# Patient Record
Sex: FEMALE | Race: WHITE | NOT HISPANIC OR LATINO | Employment: FULL TIME | URBAN - METROPOLITAN AREA
[De-identification: names, ages, dates, MRNs, and addresses within clinical notes are randomized per-mention and may not be internally consistent; named-entity substitution may affect disease eponyms.]

---

## 2018-08-22 ENCOUNTER — HOSPITAL ENCOUNTER (EMERGENCY)
Facility: HOSPITAL | Age: 28
Discharge: HOME | End: 2018-08-22
Attending: EMERGENCY MEDICINE
Payer: MEDICAID

## 2018-08-22 VITALS
HEIGHT: 62 IN | TEMPERATURE: 99 F | BODY MASS INDEX: 23 KG/M2 | DIASTOLIC BLOOD PRESSURE: 61 MMHG | WEIGHT: 125 LBS | OXYGEN SATURATION: 100 % | SYSTOLIC BLOOD PRESSURE: 107 MMHG | RESPIRATION RATE: 12 BRPM | HEART RATE: 80 BPM

## 2018-08-22 DIAGNOSIS — O21.9 NAUSEA/VOMITING IN PREGNANCY: ICD-10-CM

## 2018-08-22 DIAGNOSIS — Z3A.01 LESS THAN 8 WEEKS GESTATION OF PREGNANCY: Primary | ICD-10-CM

## 2018-08-22 LAB
ALBUMIN SERPL BCP-MCNC: 4.1 G/DL
ALP SERPL-CCNC: 62 U/L
ALT SERPL W/O P-5'-P-CCNC: 9 U/L
ANION GAP SERPL CALC-SCNC: 9 MMOL/L
AST SERPL-CCNC: 17 U/L
B-HCG UR QL: POSITIVE
BASOPHILS # BLD AUTO: 0.01 K/UL
BASOPHILS NFR BLD: 0.1 %
BILIRUB SERPL-MCNC: 0.5 MG/DL
BILIRUB UR QL STRIP: NEGATIVE
BUN SERPL-MCNC: 6 MG/DL
CALCIUM SERPL-MCNC: 9.4 MG/DL
CHLORIDE SERPL-SCNC: 104 MMOL/L
CLARITY UR: CLEAR
CO2 SERPL-SCNC: 23 MMOL/L
COLOR UR: YELLOW
CREAT SERPL-MCNC: 0.6 MG/DL
CTP QC/QA: YES
DEPRECATED S PYO AG THROAT QL EIA: NEGATIVE
DIFFERENTIAL METHOD: ABNORMAL
EOSINOPHIL # BLD AUTO: 0 K/UL
EOSINOPHIL NFR BLD: 0.1 %
ERYTHROCYTE [DISTWIDTH] IN BLOOD BY AUTOMATED COUNT: 11.9 %
EST. GFR  (AFRICAN AMERICAN): >60 ML/MIN/1.73 M^2
EST. GFR  (NON AFRICAN AMERICAN): >60 ML/MIN/1.73 M^2
FLUAV AG SPEC QL IA: NEGATIVE
FLUBV AG SPEC QL IA: NEGATIVE
GLUCOSE SERPL-MCNC: 103 MG/DL
GLUCOSE UR QL STRIP: NEGATIVE
HCG INTACT+B SERPL-ACNC: NORMAL MIU/ML
HCT VFR BLD AUTO: 37.3 %
HGB BLD-MCNC: 13 G/DL
HGB UR QL STRIP: NEGATIVE
KETONES UR QL STRIP: ABNORMAL
LEUKOCYTE ESTERASE UR QL STRIP: NEGATIVE
LYMPHOCYTES # BLD AUTO: 0.5 K/UL
LYMPHOCYTES NFR BLD: 7.1 %
MCH RBC QN AUTO: 32.7 PG
MCHC RBC AUTO-ENTMCNC: 34.9 G/DL
MCV RBC AUTO: 94 FL
MONOCYTES # BLD AUTO: 0.8 K/UL
MONOCYTES NFR BLD: 11.4 %
NEUTROPHILS # BLD AUTO: 5.6 K/UL
NEUTROPHILS NFR BLD: 81.2 %
NITRITE UR QL STRIP: NEGATIVE
PH UR STRIP: 7 [PH] (ref 5–8)
PLATELET # BLD AUTO: 162 K/UL
PMV BLD AUTO: 10.4 FL
POTASSIUM SERPL-SCNC: 3.6 MMOL/L
PROT SERPL-MCNC: 7.2 G/DL
PROT UR QL STRIP: NEGATIVE
RBC # BLD AUTO: 3.97 M/UL
SODIUM SERPL-SCNC: 136 MMOL/L
SP GR UR STRIP: 1.02 (ref 1–1.03)
SPECIMEN SOURCE: NORMAL
URN SPEC COLLECT METH UR: ABNORMAL
UROBILINOGEN UR STRIP-ACNC: 1 EU/DL
WBC # BLD AUTO: 6.86 K/UL

## 2018-08-22 PROCEDURE — 80053 COMPREHEN METABOLIC PANEL: CPT

## 2018-08-22 PROCEDURE — 85025 COMPLETE CBC W/AUTO DIFF WBC: CPT

## 2018-08-22 PROCEDURE — 81003 URINALYSIS AUTO W/O SCOPE: CPT

## 2018-08-22 PROCEDURE — 25000003 PHARM REV CODE 250: Performed by: EMERGENCY MEDICINE

## 2018-08-22 PROCEDURE — 84702 CHORIONIC GONADOTROPIN TEST: CPT

## 2018-08-22 PROCEDURE — 96365 THER/PROPH/DIAG IV INF INIT: CPT

## 2018-08-22 PROCEDURE — 87081 CULTURE SCREEN ONLY: CPT

## 2018-08-22 PROCEDURE — 81025 URINE PREGNANCY TEST: CPT | Performed by: EMERGENCY MEDICINE

## 2018-08-22 PROCEDURE — 63600175 PHARM REV CODE 636 W HCPCS: Performed by: EMERGENCY MEDICINE

## 2018-08-22 PROCEDURE — 87400 INFLUENZA A/B EACH AG IA: CPT | Mod: 59

## 2018-08-22 PROCEDURE — 87880 STREP A ASSAY W/OPTIC: CPT

## 2018-08-22 PROCEDURE — 96361 HYDRATE IV INFUSION ADD-ON: CPT

## 2018-08-22 PROCEDURE — 96375 TX/PRO/DX INJ NEW DRUG ADDON: CPT

## 2018-08-22 PROCEDURE — 99284 EMERGENCY DEPT VISIT MOD MDM: CPT | Mod: 25

## 2018-08-22 RX ORDER — PROMETHAZINE HYDROCHLORIDE 25 MG/1
25 TABLET ORAL EVERY 6 HOURS PRN
Qty: 15 TABLET | Refills: 0 | Status: SHIPPED | OUTPATIENT
Start: 2018-08-22 | End: 2018-10-19 | Stop reason: ALTCHOICE

## 2018-08-22 RX ORDER — METOCLOPRAMIDE HYDROCHLORIDE 5 MG/ML
10 INJECTION INTRAMUSCULAR; INTRAVENOUS
Status: COMPLETED | OUTPATIENT
Start: 2018-08-22 | End: 2018-08-22

## 2018-08-22 RX ORDER — PROMETHAZINE HYDROCHLORIDE 25 MG/1
25 SUPPOSITORY RECTAL EVERY 6 HOURS PRN
Qty: 10 SUPPOSITORY | Refills: 0 | Status: SHIPPED | OUTPATIENT
Start: 2018-08-22 | End: 2018-10-19

## 2018-08-22 RX ORDER — ACETAMINOPHEN 500 MG
1000 TABLET ORAL
Status: COMPLETED | OUTPATIENT
Start: 2018-08-22 | End: 2018-08-22

## 2018-08-22 RX ADMIN — ACETAMINOPHEN 1000 MG: 500 TABLET ORAL at 08:08

## 2018-08-22 RX ADMIN — PROMETHAZINE HYDROCHLORIDE 12.5 MG: 25 INJECTION INTRAMUSCULAR; INTRAVENOUS at 10:08

## 2018-08-22 RX ADMIN — METOCLOPRAMIDE 10 MG: 5 INJECTION, SOLUTION INTRAMUSCULAR; INTRAVENOUS at 08:08

## 2018-08-22 RX ADMIN — SODIUM CHLORIDE 1000 ML: 0.9 INJECTION, SOLUTION INTRAVENOUS at 09:08

## 2018-08-22 RX ADMIN — SODIUM CHLORIDE 1000 ML: 0.9 INJECTION, SOLUTION INTRAVENOUS at 08:08

## 2018-08-23 NOTE — ED PROVIDER NOTES
Encounter Date: 2018    SCRIBE #1 NOTE: I, Mir Mendez, am scribing for, and in the presence of,  Dr. Morris. I have scribed the entire note.     I, Dr. Hamzah Morris MD, personally performed the services described in this documentation. All medical record entries made by the scribe were at my direction and in my presence.  I have reviewed the chart and agree that the record reflects my personal performance and is accurate and complete. Hamzah Morris MD.    History     Chief Complaint   Patient presents with    Generalized Body Aches     pt reports generalized body aches, chills, and nausea that started last night.      CHIEF COMPLAINT: Patient presents with: Generalized Body Aches    HISTORY OF PRESENT ILLNESS: Chanelle Almazan who is a 28 y.o.  presents to the emergency department today with complaint of generalized body aches that began last night. Patient reports associated nausea, cough with clear sputum, chills, dry heave, ear pain. She denies any sore throat, abd pain, or urinary symptoms. States she is unable to tolerate PO intake. Her LNMP was around 2018. She took a pregnancy test 2 weeks ago and it read neg. She reports that she feels like she's pregnant. Has had nausea for the past 4 weeks, just recently started with the increased amount and vomiting.  She reports BM have been normal.      ALLERGIES REVIEWED  MEDICATIONS REVIEWED  PMH/PSH/SOC/FH REVIEWED     Nursing/Ancillary staff note reviewed.      The history is provided by the patient.     Review of patient's allergies indicates:  No Known Allergies  No past medical history on file.  No past surgical history on file.  No family history on file.  Social History     Tobacco Use    Smoking status: Not on file   Substance Use Topics    Alcohol use: Not on file    Drug use: Not on file     Review of Systems   Constitutional: Positive for chills. Negative for fever.   HENT: Positive for ear pain. Negative for congestion, rhinorrhea  and sore throat.    Eyes: Negative for redness and visual disturbance.   Respiratory: Positive for cough. Negative for shortness of breath and wheezing.    Cardiovascular: Negative for chest pain and palpitations.   Gastrointestinal: Positive for nausea. Negative for abdominal pain, diarrhea and vomiting.   Genitourinary: Negative for dysuria and hematuria.   Musculoskeletal: Positive for myalgias and neck stiffness. Negative for back pain and neck pain.   Skin: Negative for rash.   Neurological: Negative for dizziness, weakness and light-headedness.   Psychiatric/Behavioral: Negative for confusion.   All other systems reviewed and are negative.      Physical Exam     Initial Vitals [08/22/18 1936]   BP Pulse Resp Temp SpO2   116/74 102 18 99.2 °F (37.3 °C) 100 %      MAP       --         Physical Exam    Nursing note and vitals reviewed.  Constitutional: She appears well-developed and well-nourished. No distress.   HENT:   Head: Normocephalic and atraumatic.   Right Ear: External ear normal.   Left Ear: External ear normal.   Nose: Nose normal.   Mouth/Throat: Oropharynx is clear and moist.   Mild posterior pharyngeal erythema. No exudates.   Uvula midline without any swelling.  Linear area of red streak on upper portion of right ear canal. Appears to be a scratch.  Right TM normal.  Left TM normal.   Dry nasal mucous membrane.   Eyes: Conjunctivae and EOM are normal. Pupils are equal, round, and reactive to light. No scleral icterus.   Neck: Normal range of motion. Neck supple. No JVD present.   Cardiovascular: Normal rate, regular rhythm, normal heart sounds and intact distal pulses. Exam reveals no gallop and no friction rub.    No murmur heard.  Pulmonary/Chest: Breath sounds normal. No stridor. No respiratory distress. She has no wheezes. She exhibits no tenderness.   Abdominal: Soft. Bowel sounds are normal. She exhibits no distension and no mass. There is no tenderness. There is no rebound and no guarding.    Musculoskeletal: Normal range of motion. She exhibits no edema or tenderness.   Back is nontender to palpation.    Lymphadenopathy:     She has no cervical adenopathy.   Neurological: She is alert and oriented to person, place, and time. She has normal strength. No cranial nerve deficit.   Skin: Skin is warm and dry. Capillary refill takes less than 2 seconds. No rash noted. No pallor.   Psychiatric: She has a normal mood and affect. Thought content normal.         ED Course   Procedures  Labs Reviewed   URINALYSIS - Abnormal; Notable for the following components:       Result Value    Ketones, UA 3+ (*)     All other components within normal limits   CBC W/ AUTO DIFFERENTIAL - Abnormal; Notable for the following components:    RBC 3.97 (*)     MCH 32.7 (*)     Lymph # 0.5 (*)     Gran% 81.2 (*)     Lymph% 7.1 (*)     All other components within normal limits   COMPREHENSIVE METABOLIC PANEL - Abnormal; Notable for the following components:    ALT 9 (*)     All other components within normal limits   POCT URINE PREGNANCY - Abnormal; Notable for the following components:    POC Preg Test, Ur Positive (*)     All other components within normal limits   THROAT SCREEN, RAPID   CULTURE, STREP A,  THROAT   INFLUENZA A AND B ANTIGEN   HCG, QUANTITATIVE, PREGNANCY          Imaging Results          US OB Less Than 14 Wks with Transvaginal (xpd) (Final result)  Result time 08/22/18 22:43:32   Procedure changed from US OB Less Than 14 Wks First Gestation     Final result by Timbo Leal MD (08/22/18 22:43:32)                 Impression:      Single live intrauterine gestation corresponding to 7 weeks and 5 days.  Expected date of delivery of 04/05/2019.  Associated small subchorionic hemorrhage.  Continued short-term follow-up is suggested.      Electronically signed by: Timbo Leal MD  Date:    08/22/2018  Time:    22:43             Narrative:    EXAMINATION:  US OB LESS THAN 14 WKS WITH TRANSVAGINAL (XPD)    CLINICAL  HISTORY:  abdomen pain; Unspecified abdominal pain    TECHNIQUE:  Transabdominal sonography of the pelvis was performed, followed by transvaginal sonography to better evaluate the uterus and ovaries.    COMPARISON:  None.    FINDINGS:  There is a single intrauterine gestation with crown-rump length measuring 10.7 mm.  There is a yolk sac present measuring 2.4 mm.  The gestational sac diameter measures 3 cm.  There is a small subchorionic hemorrhage.  The average ultrasound age is 7 weeks and 5 days.  The expected date of delivery is 04/05/2019.  The fetal heart rate is 143 beats per minute.  The cervix is closed.    The right ovary measures 3.2 x 3.8 x 1.5 cm.  There is a right-sided corpus luteum cyst measuring 2.1 x 1.1 x 1.8 cm.  The left ovary measures 2.1 x 2.7 x 1.3 cm.  There is normal flow to both ovaries.    There is no evidence of free fluid.                                 Medical Decision Making:   Initial Assessment:   Chanelle Almazan presents to the emergency department today complaining of generalized body aches, cough and is pregnant.  Her cough has been present for 1 day.  She does not appear septic.  Her exam does show that she has some erythema to the posterior pharynx.  Her earache is likely due to scratch than the right canal.  I will obtain influenza, strep throat swab, UA, and check her labs.  Ultrasound dependent on bHCG level. Treat and reassess.     Differential Diagnosis:   Pregnancy, ectopic pregnancy, Gastritis, gastroenteritis, cholecystitis, cholelithiasis, pancreatitis, small bowel obstruction, ileus, appendicitis, urinary tract infection, ovarian cyst, PID    Clinical Tests:   Lab Tests: Ordered and Reviewed  Radiological Study: Ordered and Reviewed  ED Management:  9:15 pm The pts BHCG is at a level where we should be able to see on ultrasound, will get ultrasound.  She has 3+ ketones in her urine.  She is receiving IV fluids.    10:20 PM patient has had another episode of vomiting.   I will give her some Phenergan.    11:00 PM I discussed the results of her ultrasound.  She is feeling improved.  We will attempt a p.o. challenge and if she tolerates will discharge home.    11:31 PM patient is feeling much improved following her Phenergan.  Patient was able to tolerate PO challenge which was water. States she is ready to go home at this time.  After taking into careful account the historical factors and physical exam findings of the patient's presentation today, in conjunction with the empirical and objective data obtained on ED workup, no acute emergent medical condition requiring admission has been identified. The patient appears to be low risk for an emergent medical condition and I feel it is safe and appropriate at this time for the patient to be discharged to follow-up as detailed in their discharge instructions for reevaluation and possible continued outpatient workup and management. I have discussed the specifics of the workup with the patient and the patient has verbalized understanding of the details of the workup, the diagnosis, the treatment plan, and the need for outpatient follow-up.  Although the patient has no emergent etiology today this does not preclude the development of an emergent condition so in addition, I have advised the patient that they can return to the ED and/or activate EMS at any time with worsening of their symptoms, change of their symptoms, or with any other medical complaint.  The patient remained comfortable and stable during their visit in the ED.  Discharge and follow-up instructions discussed with the patient who expressed understanding and willingness to comply with my recommendations.     This medical record was prepared using voice dictation software.                        Clinical Impression:     1. Less than 8 weeks gestation of pregnancy    2. Nausea/vomiting in pregnancy            Disposition:   Disposition: Discharged  Condition:  Stable                        Hamzah Morris MD  08/23/18 0155

## 2018-08-23 NOTE — DISCHARGE INSTRUCTIONS
Take your medications as prescribed.  Follow up with your Ob/Gyn for recheck.  Return to the emergency department having increasing pain, abnormal vaginal bleeding, or any other concerns.  Please refer to the additional material providing further information including when return to the emergency department.

## 2018-08-23 NOTE — ED TRIAGE NOTES
Patient presents to the ED with complaints of generalized body aches, stiff neck,  and vomiting since last night. Patient states she thinks she could be pregnant. Last period was in . She took a home pregnancy test and it said negative. Patient reports having small children that just started school and one let her know he was feeling bad. Patient states she feels dehydrated.    Review of patient's allergies indicates:  No Known Allergies     Patient has verified the spelling of their name and  on armband.   APPEARANCE: Patient is alert, calm, oriented x 4, and does not appear distressed.  HEENT: dry mucus membranes  SKIN: Skin is normal for race, warm, and dry. Normal skin turgor and mucous membranes moist.  CARDIAC: Normal rate and rhythm, no murmur heard.   RESPIRATORY:Normal rate and effort. Breath sounds clear bilaterally throughout chest. Respirations are equal and unlabored.  +dry cough  GASTRO: Bowel sounds normal, abdomen is soft, no tenderness, and no abdominal distention. +nausea, vomiting  MUSCLE: Full ROM. No bony tenderness or soft tissue tenderness. No obvious deformity. +generalized body aches, stiff neck

## 2018-08-23 NOTE — ED NOTES
Updated patient on all labs that have resulted. Let her know an US would be done next. Second bag of fluids started. Patient given blanket

## 2018-08-25 LAB — BACTERIA THROAT CULT: NORMAL

## 2018-10-19 ENCOUNTER — OFFICE VISIT (OUTPATIENT)
Dept: OBSTETRICS AND GYNECOLOGY | Facility: CLINIC | Age: 28
End: 2018-10-19
Payer: MEDICAID

## 2018-10-19 VITALS
WEIGHT: 122.38 LBS | BODY MASS INDEX: 22.52 KG/M2 | DIASTOLIC BLOOD PRESSURE: 60 MMHG | HEIGHT: 62 IN | SYSTOLIC BLOOD PRESSURE: 98 MMHG

## 2018-10-19 DIAGNOSIS — Z30.09 ENCOUNTER FOR OTHER GENERAL COUNSELING OR ADVICE ON CONTRACEPTION: ICD-10-CM

## 2018-10-19 DIAGNOSIS — Z01.419 WELL WOMAN EXAM WITH ROUTINE GYNECOLOGICAL EXAM: Primary | ICD-10-CM

## 2018-10-19 PROCEDURE — 99999 PR PBB SHADOW E&M-EST. PATIENT-LVL III: CPT | Mod: PBBFAC,,, | Performed by: OBSTETRICS & GYNECOLOGY

## 2018-10-19 PROCEDURE — 99213 OFFICE O/P EST LOW 20 MIN: CPT | Mod: PBBFAC,PO | Performed by: OBSTETRICS & GYNECOLOGY

## 2018-10-19 PROCEDURE — 99385 PREV VISIT NEW AGE 18-39: CPT | Mod: S$PBB,,, | Performed by: OBSTETRICS & GYNECOLOGY

## 2018-10-19 RX ORDER — LEVONORGESTREL AND ETHINYL ESTRADIOL 0.1-0.02MG
1 KIT ORAL DAILY
Qty: 28 TABLET | Refills: 11 | Status: SHIPPED | OUTPATIENT
Start: 2018-10-19 | End: 2018-12-05

## 2018-10-19 NOTE — PROGRESS NOTES
GYNECOLOGY OFFICE NOTE    Reason for visit: annual    HPI: Pt is a 28 y.o.  female  who presents for annual. Cycle: menarche- 1, Interval-  Q month, Duration- 7-9 days, Flow- normal (changing pad every 3 hrs (not saturated)), reports dysmenorrhea (no meds). She is sexually active.  She uses no method for contraception.  She does not desire STI screening. She denies vaginal discharge.  Last pap: 2017, denies hx of abnormal. The use of hormonal contraception has been fully discussed with the patient. We discussed all options including OCPs, transdermal patches, vaginal ring, Depo Provera injections, Nexplanon, and IUD. Warnings about anticipated minor side effects such as breakthrough spotting, nausea, breast tenderness, weight changes, acne, headaches, etc were given.  She has been told of the more serious potential side effects such as MI, stroke, and deep vein thrombosis, all of which are very unlikely.  She has been asked to report any signs of such serious problems immediately. The need for additional protection, such as a condom, to prevent exposure to sexually transmitted diseases has also been discussed- the patient has been clearly reminded that no hormonal contraceptive method can protect her against diseases such as HIV and others. She understands and wishes to begin pills until mirena comes in.       History reviewed. No pertinent past medical history.    Past Surgical History:   Procedure Laterality Date    DILATION AND CURETTAGE OF UTERUS  2018    TONSILLECTOMY, ADENOIDECTOMY         Family History   Problem Relation Age of Onset    Colon cancer Paternal Grandmother     Prostate cancer Maternal Grandfather     Heart attack Maternal Grandfather     Diabetes Mother        Social History     Tobacco Use    Smoking status: Never Smoker   Substance Use Topics    Alcohol use: No     Frequency: Never    Drug use: No       OB History    Para Term  AB Living   4 3 3   1 3  "  SAB TAB Ectopic Multiple Live Births     1     3      # Outcome Date GA Lbr Danny/2nd Weight Sex Delivery Anes PTL Lv   4 TAB            3 Term      Vag-Spont  N BENOIT   2 Term      Vag-Spont  N BENOIT   1 Term      Vag-Spont  N BENOIT          Current Outpatient Medications   Medication Sig    levonorgestrel-ethinyl estradiol (AVIANE,ALESSE,LESSINA) 0.1-20 mg-mcg per tablet Take 1 tablet by mouth once daily.     No current facility-administered medications for this visit.        Allergies: Patient has no known allergies.     BP 98/60   Ht 5' 2" (1.575 m)   Wt 55.5 kg (122 lb 5.7 oz)   LMP 06/27/2018   BMI 22.38 kg/m²     ROS:  GENERAL: Denies fever or chills.   SKIN: Denies rash or lesions.   HEAD: Denies head injury or headache.   CHEST: Denies chest pain or shortness of breath.   CARDIOVASCULAR: Denies palpitations or chest pain.   ABDOMEN: No abdominal pain, constipation, diarrhea, nausea, vomiting or rectal bleeding.   URINARY: No dysuria, hematuria, or burning on urination.  REPRODUCTIVE: See HPI.   BREASTS: see HPI  NEUROLOGIC: Denies syncope or weakness.     Physical Exam:  GENERAL: alert, appears stated age and cooperative  NEUROLOGIC: orientated to person, place and time, normal mood and affect   CHEST: Normal respiratory effort  NECK: normal appearance, no thyromegaly masses or tenderness  SKIN: no acne, striae, hirsutism  BREAST EXAM: breasts appear normal, no suspicious masses, no skin or nipple changes or axillary nodes  ABDOMEN: abdomen is soft without significant tenderness, masses, organomegaly or guarding, no hernias noted  EXTERNAL GENITALIA:  normal general appearance  URETHRA: normal urethra, normal urethral meatus  VAGINA:  normal mucosa without prolapse or lesions  CERVIX:  Normal  UTERUS:  normal size, mobile, non-tender, normal shape and consistency  ADNEXA:  normal adnexa in size, nontender and no masses    Diagnosis:  1. Well woman exam with routine gynecological exam    2. Encounter for " other general counseling or advice on contraception        Plan:   1. Annual  2. UPT negative- rx ocp sent while awaiting mirena    Orders Placed This Encounter    POCT urine pregnancy    levonorgestrel-ethinyl estradiol (AVIANE,BIJAN,LESSINA) 0.1-20 mg-mcg per tablet       Patient was counseled today on the new ACS guidelines for cervical cytology screening as well as the current recommendations for breast cancer screening. She was counseled to follow up with her PCP for other routine health maintenance.     Follow-up for iud placement.      Brenda Howard MD  OB/GYN  Pager: [phone]

## 2018-11-02 ENCOUNTER — TELEPHONE (OUTPATIENT)
Dept: OBSTETRICS AND GYNECOLOGY | Facility: CLINIC | Age: 28
End: 2018-11-02

## 2018-11-02 NOTE — TELEPHONE ENCOUNTER
Attempted to contact pt to inform we have received her Mirena, no answer, left detailed VM to return call.

## 2018-12-05 ENCOUNTER — OFFICE VISIT (OUTPATIENT)
Dept: OBSTETRICS AND GYNECOLOGY | Facility: CLINIC | Age: 28
End: 2018-12-05
Payer: MEDICAID

## 2018-12-05 VITALS
HEIGHT: 62 IN | WEIGHT: 123.88 LBS | BODY MASS INDEX: 22.8 KG/M2 | SYSTOLIC BLOOD PRESSURE: 100 MMHG | DIASTOLIC BLOOD PRESSURE: 54 MMHG

## 2018-12-05 DIAGNOSIS — Z30.430 ENCOUNTER FOR IUD INSERTION: Primary | ICD-10-CM

## 2018-12-05 PROCEDURE — 99499 UNLISTED E&M SERVICE: CPT | Mod: S$PBB,,, | Performed by: OBSTETRICS & GYNECOLOGY

## 2018-12-05 PROCEDURE — 58300 INSERT INTRAUTERINE DEVICE: CPT | Mod: S$PBB,,, | Performed by: OBSTETRICS & GYNECOLOGY

## 2018-12-05 PROCEDURE — 99999 PR PBB SHADOW E&M-EST. PATIENT-LVL III: CPT | Mod: PBBFAC,,, | Performed by: OBSTETRICS & GYNECOLOGY

## 2018-12-05 PROCEDURE — 58300 INSERT INTRAUTERINE DEVICE: CPT | Mod: PBBFAC,PO | Performed by: OBSTETRICS & GYNECOLOGY

## 2018-12-05 PROCEDURE — 99213 OFFICE O/P EST LOW 20 MIN: CPT | Mod: PBBFAC,PO | Performed by: OBSTETRICS & GYNECOLOGY

## 2018-12-05 RX ORDER — LEVONORGESTREL 52 MG/1
INTRAUTERINE DEVICE INTRAUTERINE
Refills: 0 | COMMUNITY
Start: 2018-10-29 | End: 2022-02-01

## 2018-12-05 RX ADMIN — LEVONORGESTREL 1 INTRA UTERINE DEVICE: 52 INTRAUTERINE DEVICE INTRAUTERINE at 03:12

## 2018-12-05 NOTE — PROGRESS NOTES
DATE: 12/5/18    TIME: 1533    PROCEDURE:  Mirena insertion    INDICATION: Contraception    PATIENT CONSENT: She was counseled on the risks, benefits, indications, and alternatives to IUD use.  She understands that with insertion there is a risk of bleeding, infection, and uterine perforation.  All questions are answered.  Consents signed.     LABS: UPT is negative. Cervical cultures were not performed.    ANESTHESIA: NONE    PROCEDURE NOTE:    Time out performed.  The cervix was visualized with a speculum. The cervix was cleansed with betadine swab x 3.  A single tooth tenaculum was placed on the anterior lip of the cervix. The uterus sounds to 10cm using sterile technique. A Mirena was loaded and placed high in the uterine fundus without difficulty using sterile technique.The string was was then cut.The tenaculum and speculum were removed.    COMPLICATIONS: None    PATIENT DISPOSITION: The patient tolerated the procedure well.    Assessment:  1.  Contraceptive management/IUD insertion    Post IUD placement counseling:  Manage post IUD placement pain with NSAIDS, Tylenol or Rx per Medcard. IUD danger signs and how to check for strings were discussed. The IUD needs to be removed in 5 years for Mirena and 10 years for Copper IUD.    Follow up:  4 weeks for string check    Brenda Howard MD  OB/GYN  Pager: [phone]

## 2018-12-18 ENCOUNTER — TELEPHONE (OUTPATIENT)
Dept: OBSTETRICS AND GYNECOLOGY | Facility: CLINIC | Age: 28
End: 2018-12-18

## 2018-12-18 NOTE — TELEPHONE ENCOUNTER
Returned pt's call, pt states she is experiencing really bad cramping in her lower back/abdomen 7 on pain scale. Symptoms started at 3am today 12/18, pt took 1 800mg Ibuprofen around 6am and states it didn't help with the pain. Pt states she is still bleeding since having the IUD place 2wks ago but it's light and know that's normal since she's previously had a Mirena placed. Pt states right now the pain is bearable but wanted to inform Dr. Howard. Informed pt Dr. Howard and Dr on call are both in surgery until 1pm today and the message would be sent to Dr. Howard however in the meantime if the pain becomes unbearable where she can't sit, walk, lie down or experience heavy bleeding changing a pad every 30mins-1hr to go to the ED immediately, Patient verbalized understanding.

## 2018-12-18 NOTE — TELEPHONE ENCOUNTER
Contacted pt and informed I spoke with Dr. Howard and she states I can add her on for around 3pm today when she'll be out of surgery. However if the pain is still severe at this time she may need to go to the ED versus if the pain is tolerable she can be seen in clinic tomorrow. Pt states she is unable to come in today at 3pm due to having a prior appt in [place], but her pain is tolerable now and would like to not go to ED and come to clinic tomorrow. Pt scheduled for 12/19 at 10:15am, Patient verbalized understanding.

## 2018-12-18 NOTE — TELEPHONE ENCOUNTER
Can try to add her on for around 3pm today when I get out of surgery. If pain is still severe she may need to go to the ED but I would hate for her to do that if the pain is not severe. I also have opening tomorrow if the pain is tolerable.     Brenda Howard MD, FACOG  OB/GYN  Pager: [phone]

## 2018-12-18 NOTE — TELEPHONE ENCOUNTER
----- Message from Nicole Caballero sent at 12/18/2018  9:45 AM CST -----  Contact: [phone]/self  Patient is requesting a call back. She is cramping like she is in labor. Wants to let doctor know and what should she do. thanks

## 2018-12-19 ENCOUNTER — OFFICE VISIT (OUTPATIENT)
Dept: OBSTETRICS AND GYNECOLOGY | Facility: CLINIC | Age: 28
End: 2018-12-19
Payer: MEDICAID

## 2018-12-19 ENCOUNTER — HOSPITAL ENCOUNTER (OUTPATIENT)
Dept: RADIOLOGY | Facility: HOSPITAL | Age: 28
Discharge: HOME | End: 2018-12-19
Attending: OBSTETRICS & GYNECOLOGY
Payer: MEDICAID

## 2018-12-19 VITALS
SYSTOLIC BLOOD PRESSURE: 100 MMHG | WEIGHT: 122.13 LBS | BODY MASS INDEX: 22.48 KG/M2 | DIASTOLIC BLOOD PRESSURE: 62 MMHG | HEIGHT: 62 IN

## 2018-12-19 DIAGNOSIS — R10.2 PELVIC PAIN IN FEMALE: ICD-10-CM

## 2018-12-19 DIAGNOSIS — Z30.431 IUD CHECK UP: Primary | ICD-10-CM

## 2018-12-19 PROCEDURE — 99213 OFFICE O/P EST LOW 20 MIN: CPT | Mod: PBBFAC,PO,25 | Performed by: OBSTETRICS & GYNECOLOGY

## 2018-12-19 PROCEDURE — 76856 US EXAM PELVIC COMPLETE: CPT | Mod: TC

## 2018-12-19 PROCEDURE — 99999 PR PBB SHADOW E&M-EST. PATIENT-LVL III: CPT | Mod: PBBFAC,,, | Performed by: OBSTETRICS & GYNECOLOGY

## 2018-12-19 PROCEDURE — 99212 OFFICE O/P EST SF 10 MIN: CPT | Mod: S$PBB,,, | Performed by: OBSTETRICS & GYNECOLOGY

## 2018-12-19 PROCEDURE — 76856 US EXAM PELVIC COMPLETE: CPT | Mod: 26,,, | Performed by: RADIOLOGY

## 2018-12-19 PROCEDURE — 76830 TRANSVAGINAL US NON-OB: CPT | Mod: 26,,, | Performed by: RADIOLOGY

## 2018-12-19 NOTE — PROGRESS NOTES
"       GYNECOLOGY OFFICE NOTE    Reason for visit: pelvic pain    HPI: Pt is a 28 y.o.  female  who presents for evaluation of pelvic pain that started yesterday. Sharp pain lower back and lower abdomen. Took 800mg of ibuprofen this morning at 5am which helped. Only minimal spotting. Pain is better today than yesterday when the ibuprofen didn't work at all.     History reviewed. No pertinent past medical history.    Past Surgical History:   Procedure Laterality Date    DILATION AND CURETTAGE OF UTERUS  2018    TONSILLECTOMY, ADENOIDECTOMY         Family History   Problem Relation Age of Onset    Colon cancer Paternal Grandmother     Prostate cancer Maternal Grandfather     Heart attack Maternal Grandfather     Diabetes Mother        Social History     Tobacco Use    Smoking status: Never Smoker   Substance Use Topics    Alcohol use: No     Frequency: Never    Drug use: No       OB History    Para Term  AB Living   4 3 3   1 3   SAB TAB Ectopic Multiple Live Births     1     3      # Outcome Date GA Lbr Danny/2nd Weight Sex Delivery Anes PTL Lv   4 TAB            3 Term      Vag-Spont  N BENOIT   2 Term      Vag-Spont  N BENOIT   1 Term      Vag-Spont  N BENOIT          Current Outpatient Medications   Medication Sig    MIRENA 20 mcg/24 hr (5 years) IUD TO BE INSERTED ONE TIME BY PRESCRIBER. ROUTE INTRAUTERINE.     No current facility-administered medications for this visit.        Allergies: Patient has no known allergies.     /62   Ht 5' 2" (1.575 m)   Wt 55.4 kg (122 lb 2.2 oz)   LMP 2018   BMI 22.34 kg/m²     ROS:  GENERAL: Denies fever or chills.   SKIN: Denies rash or lesions.   HEAD: Denies head injury or headache.   CHEST: Denies chest pain or shortness of breath.   CARDIOVASCULAR: Denies palpitations or chest pain.   ABDOMEN: No abdominal pain, constipation, diarrhea, nausea, vomiting or rectal bleeding.   URINARY: No dysuria, hematuria, or burning on " urination.  REPRODUCTIVE: See HPI.   BREASTS: see HPI  NEUROLOGIC: Denies syncope or weakness.     Physical Exam:  GENERAL: alert, appears stated age and cooperative  NEUROLOGIC: orientated to person, place and time, normal mood and affect   CHEST: Normal respiratory effort  NECK: normal appearance, no thyromegaly masses or tenderness  SKIN: no acne, striae, hirsutism  BREAST EXAM: not examined  ABDOMEN: abdomen is soft without significant tenderness, masses, organomegaly or guarding, no hernias noted  EXTERNAL GENITALIA:  normal general appearance  URETHRA: normal urethra, normal urethral meatus  VAGINA:  normal mucosa without prolapse or lesions  CERVIX:  Cervix normal- IUD strings visible  UTERUS:  mobile, non-tender  ADNEXA:  no masses    Diagnosis:  1. IUD check up    2. Pelvic pain in female        Plan:   1. iud strings seen but U/S to confirm placement.     Orders Placed This Encounter    US Pelvis Comp with Transvag NON-OB (xpd    US OB/GYN Procedure ([facility])       Patient was counseled today on the new ACS guidelines for cervical cytology screening as well as the current recommendations for breast cancer screening. She was counseled to follow up with her PCP for other routine health maintenance.     Follow-up if symptoms worsen or fail to improve.      Brenda Howard MD  OB/GYN  Pager: [phone]

## 2018-12-20 ENCOUNTER — TELEPHONE (OUTPATIENT)
Dept: OBSTETRICS AND GYNECOLOGY | Facility: CLINIC | Age: 28
End: 2018-12-20

## 2018-12-20 DIAGNOSIS — R10.2 PELVIC PAIN IN FEMALE: Primary | ICD-10-CM

## 2018-12-20 NOTE — TELEPHONE ENCOUNTER
Pt returned Dr. Howard's call, transferred by phone, pt verified Name/. Informed pt Dr. Howard was calling with her u/s results.informed pt the IUD is in her uterus however there was an area of vascularity seen. Informed pt if could possibly be something left behind following her recent TAB although both upts in clinic on 10/19 and  were negative. Informed pt Dr. Howard's recommendation is to proceed with beta hcg prior to ordering additional imaging. Advised pt to go to any [facility] lab to have the blood work done as soon as possible, the order is in the system as standing. Patient verbalized understanding.

## 2018-12-20 NOTE — TELEPHONE ENCOUNTER
Ultrasound reviewed: IUD in uterus but area of vascularity seen. Pt reports hx of termination this year but multiple negative UPTs prior to IUD placement.Recommend proceeding with beta hcg prior to ordering additional imaging. Called patient no answer. Message sent in [employer] as well.     Brenda Howard MD, FACOG  OB/GYN  Pager: [phone]

## 2018-12-21 ENCOUNTER — HOSPITAL ENCOUNTER (EMERGENCY)
Facility: HOSPITAL | Age: 28
Discharge: HOME | End: 2018-12-21
Attending: EMERGENCY MEDICINE
Payer: MEDICAID

## 2018-12-21 ENCOUNTER — TELEPHONE (OUTPATIENT)
Dept: OBSTETRICS AND GYNECOLOGY | Facility: CLINIC | Age: 28
End: 2018-12-21

## 2018-12-21 VITALS
OXYGEN SATURATION: 99 % | HEART RATE: 96 BPM | SYSTOLIC BLOOD PRESSURE: 114 MMHG | RESPIRATION RATE: 18 BRPM | BODY MASS INDEX: 22.66 KG/M2 | TEMPERATURE: 98 F | HEIGHT: 61 IN | WEIGHT: 120 LBS | DIASTOLIC BLOOD PRESSURE: 68 MMHG

## 2018-12-21 DIAGNOSIS — N94.6 DYSMENORRHEA: Primary | ICD-10-CM

## 2018-12-21 LAB
BACTERIA #/AREA URNS HPF: ABNORMAL /HPF
BASOPHILS # BLD AUTO: 0 K/UL
BASOPHILS NFR BLD: 0 %
BILIRUB UR QL STRIP: ABNORMAL
CLARITY UR: CLEAR
COLOR UR: ABNORMAL
DIFFERENTIAL METHOD: ABNORMAL
EOSINOPHIL # BLD AUTO: 0 K/UL
EOSINOPHIL NFR BLD: 0.2 %
ERYTHROCYTE [DISTWIDTH] IN BLOOD BY AUTOMATED COUNT: 12.6 %
GLUCOSE UR QL STRIP: NEGATIVE
HCG INTACT+B SERPL-ACNC: <1.2 MIU/ML
HCT VFR BLD AUTO: 35.5 %
HGB BLD-MCNC: 11.7 G/DL
HGB UR QL STRIP: ABNORMAL
HYALINE CASTS #/AREA URNS LPF: 0 /LPF
KETONES UR QL STRIP: ABNORMAL
LEUKOCYTE ESTERASE UR QL STRIP: NEGATIVE
LYMPHOCYTES # BLD AUTO: 1.2 K/UL
LYMPHOCYTES NFR BLD: 14.1 %
MCH RBC QN AUTO: 31.5 PG
MCHC RBC AUTO-ENTMCNC: 33 G/DL
MCV RBC AUTO: 95 FL
MICROSCOPIC COMMENT: ABNORMAL
MONOCYTES # BLD AUTO: 0.9 K/UL
MONOCYTES NFR BLD: 10.4 %
NEUTROPHILS # BLD AUTO: 6.3 K/UL
NEUTROPHILS NFR BLD: 75.1 %
NITRITE UR QL STRIP: NEGATIVE
PH UR STRIP: 6 [PH] (ref 5–8)
PLATELET # BLD AUTO: 182 K/UL
PMV BLD AUTO: 10.2 FL
PROT UR QL STRIP: ABNORMAL
RBC # BLD AUTO: 3.72 M/UL
RBC #/AREA URNS HPF: >100 /HPF (ref 0–4)
SP GR UR STRIP: >=1.03 (ref 1–1.03)
SQUAMOUS #/AREA URNS HPF: 5 /HPF
URN SPEC COLLECT METH UR: ABNORMAL
UROBILINOGEN UR STRIP-ACNC: NEGATIVE EU/DL
WBC # BLD AUTO: 8.43 K/UL
WBC #/AREA URNS HPF: 10 /HPF (ref 0–5)

## 2018-12-21 PROCEDURE — 84702 CHORIONIC GONADOTROPIN TEST: CPT

## 2018-12-21 PROCEDURE — 25000003 PHARM REV CODE 250: Performed by: NURSE PRACTITIONER

## 2018-12-21 PROCEDURE — 99283 EMERGENCY DEPT VISIT LOW MDM: CPT

## 2018-12-21 PROCEDURE — 81000 URINALYSIS NONAUTO W/SCOPE: CPT

## 2018-12-21 PROCEDURE — 85025 COMPLETE CBC W/AUTO DIFF WBC: CPT

## 2018-12-21 RX ORDER — NAPROXEN 500 MG/1
500 TABLET ORAL 2 TIMES DAILY WITH MEALS
Qty: 25 TABLET | Refills: 0 | OUTPATIENT
Start: 2018-12-21 | End: 2020-02-29

## 2018-12-21 RX ORDER — IBUPROFEN 600 MG/1
600 TABLET ORAL
Status: COMPLETED | OUTPATIENT
Start: 2018-12-21 | End: 2018-12-21

## 2018-12-21 RX ADMIN — IBUPROFEN 600 MG: 600 TABLET, FILM COATED ORAL at 12:12

## 2018-12-21 NOTE — ED PROVIDER NOTES
Encounter Date: 2018       History     Chief Complaint   Patient presents with    Female  Problem     IUD placed 3 weeks ago by Dr. Howard in office, today started with heavier than normal bleeding      29 y/o female with no PMH presents for increased vaginal bleeding with clots and pelvic cramping that radiates to bilateral lower back since yesterday. Pt had IUD placed 2 wks ago and had a regular menstrual cycle immediately after then light spotting. Pt has seen  for this issue and had an U/S done on 18. Pt also had  with D&C . Pt did have f/u after  with U/S.   Pt denies F, N/V/D, vaginal d/c, concern for STIs, dysuria or frequency.       The history is provided by the patient.   Female  Problem   Primary symptoms include pelvic pain, vaginal bleeding.    Primary symptoms include no fever, no dysuria.   Pertinent negatives include no abdominal pain, no diarrhea, no nausea, no vomiting and no dizziness.     Review of patient's allergies indicates:  No Known Allergies  History reviewed. No pertinent past medical history.  Past Surgical History:   Procedure Laterality Date    DILATION AND CURETTAGE OF UTERUS  2018    TONSILLECTOMY, ADENOIDECTOMY       Family History   Problem Relation Age of Onset    Colon cancer Paternal Grandmother     Prostate cancer Maternal Grandfather     Heart attack Maternal Grandfather     Diabetes Mother      Social History     Tobacco Use    Smoking status: Never Smoker   Substance Use Topics    Alcohol use: No     Frequency: Never    Drug use: No     Review of Systems   Constitutional: Negative for fever.   Respiratory: Negative for shortness of breath.    Cardiovascular: Negative for chest pain.   Gastrointestinal: Negative for abdominal pain, diarrhea, nausea and vomiting.   Genitourinary: Positive for pelvic pain and vaginal bleeding. Negative for decreased urine volume, dysuria, flank pain and vaginal discharge.    Musculoskeletal: Positive for back pain (bilateral lumbar ).   Skin: Negative for rash and wound.   Allergic/Immunologic: Negative for immunocompromised state.   Neurological: Negative for dizziness, weakness and headaches.   Hematological: Does not bruise/bleed easily.   All other systems reviewed and are negative.      Physical Exam     Initial Vitals [12/21/18 1136]   BP Pulse Resp Temp SpO2   114/76 (!) 124 18 97.9 °F (36.6 °C) 100 %      MAP       --         Physical Exam    Nursing note and vitals reviewed.  Constitutional: Vital signs are normal. She appears well-developed and well-nourished. She is cooperative.  Non-toxic appearance. She does not appear ill. No distress.   HENT:   Head: Atraumatic.   Mouth/Throat: Oropharynx is clear and moist.   Eyes: Conjunctivae and EOM are normal.   Neck: Full passive range of motion without pain.   Cardiovascular: Normal rate, regular rhythm and normal heart sounds.   Pulses:       Dorsalis pedis pulses are 2+ on the right side, and 2+ on the left side.   Pulmonary/Chest: Effort normal and breath sounds normal.   Abdominal: Soft. Normal appearance and bowel sounds are normal. There is tenderness in the right lower quadrant, suprapubic area and left lower quadrant.   Genitourinary: Rectum normal. Pelvic exam was performed with patient supine. Cervix exhibits no motion tenderness. Right adnexum displays no mass and no tenderness. Left adnexum displays no mass and no tenderness. There is bleeding in the vagina. No tenderness in the vagina.   Musculoskeletal: Normal range of motion.   Neurological: She is alert and oriented to person, place, and time. She has normal strength. No cranial nerve deficit or sensory deficit.   Skin: Skin is warm and intact. Capillary refill takes less than 2 seconds.   Psychiatric: She has a normal mood and affect. Her speech is normal and behavior is normal.         ED Course   Procedures  Labs Reviewed   CBC W/ AUTO DIFFERENTIAL - Abnormal;  Notable for the following components:       Result Value    RBC 3.72 (*)     Hemoglobin 11.7 (*)     Hematocrit 35.5 (*)     MCH 31.5 (*)     Gran% 75.1 (*)     Lymph% 14.1 (*)     All other components within normal limits   URINALYSIS, REFLEX TO URINE CULTURE - Abnormal; Notable for the following components:    Color, UA Red (*)     Specific Gravity, UA >=1.030 (*)     Protein, UA 2+ (*)     Ketones, UA 3+ (*)     Bilirubin (UA) 1+ (*)     Occult Blood UA 3+ (*)     All other components within normal limits    Narrative:     Preferred Collection Type->Urine, Clean Catch   URINALYSIS MICROSCOPIC - Abnormal; Notable for the following components:    RBC, UA >100 (*)     WBC, UA 10 (*)     Bacteria, UA Moderate (*)     All other components within normal limits    Narrative:     Preferred Collection Type->Urine, Clean Catch   HCG, QUANTITATIVE, PREGNANCY          Imaging Results    None          Medical Decision Making:   Initial Assessment:   Pt presents for increased vaginal bleeding with clots and pelvic cramping that radiates to bilateral lower back since yesterday.IUD placed 2 wks ago and had a regular menstrual cycle immediately after then light spotting. Pt has seen  for this issue, U/S done on 18.   Pt had  with D&C . Pt did have f/u after  with U/S. Lee had wanted pt to have BHcg as outpt when bleeding sx started.   Pt denies F, N/V/D, vaginal d/c, concern for STIs, dysuria or frequency.     Differential Diagnosis:   Dysmenorrhea, AUB, bleeding s/p IUD placement  Clinical Tests:   Lab Tests: Ordered and Reviewed  ED Management:  H&H stable, BHcg neg. Pt having heavy BRB, no clots noted on exam. Pt to take Naproxen for pain, strict return precautions discussed. Pt to call Dr. Howard's office today for f/u.   Pt to return to ER for any concerns, a worsening or change in current condition. Pt and partner verbalized understanding of all d/c instructions.     Other:   I have  discussed this case with another health care provider.              Attending Attestation:     Physician Attestation Statement for NP/PA:   I discussed this assessment and plan of this patient with the NP/PA, but I did not personally examine the patient. The face to face encounter was performed by the NP/PA.                     Clinical Impression:   The encounter diagnosis was Dysmenorrhea.                             Yeison Levin NP  12/22/18 3065       Rayo Lua MD  12/22/18 4361

## 2018-12-21 NOTE — TELEPHONE ENCOUNTER
----- Message from Chana Sterling sent at 12/21/2018  2:07 PM CST -----  Contact: [phone]/self  Patient requesting to speak with you concerning heavy bleeding, and she was seen in the ER on today.  She states she was told to follow-up with Dr. Lee RUIZ.    Please call and advise

## 2018-12-21 NOTE — DISCHARGE INSTRUCTIONS
Take the prescribed medication with food for cramping. Make an appointment with Dr. Howard today for follow up.

## 2018-12-21 NOTE — TELEPHONE ENCOUNTER
"Returned pt's call, pt states at around 10:30 this morning she experienced an episode of heavy bleeding, bled through sweatpants&couch, got in the shower and noticed she was passing "nice size" clots. Pt rushed to the ED and was told the bleeding could be from the mirena irritating her uterus and that Dr. Howard would read the chart and call her. Pt states she is not weak, faint, or dizzy, no abdominal pain or cramping and the bleeding has slacked up a lot to light bleeding. Pt was prescribed Naproxen and is helping. Pt was prev advised to have bHCG drawn and go from there following u/s results on 12/19, pt had bHCG in ED today and would like to know her next steps.  "

## 2018-12-22 NOTE — TELEPHONE ENCOUNTER
I would recommend removal of IUD if she is having continued pain and bleeding. Too late to call patient. Message sent in [facility] as well    Brenda Howard MD, FACOG  OB/GYN  Pager: [phone]

## 2020-02-29 ENCOUNTER — HOSPITAL ENCOUNTER (EMERGENCY)
Facility: HOSPITAL | Age: 30
Discharge: HOME | End: 2020-02-29
Attending: EMERGENCY MEDICINE
Payer: MEDICAID

## 2020-02-29 VITALS
TEMPERATURE: 98 F | HEIGHT: 62 IN | WEIGHT: 125 LBS | DIASTOLIC BLOOD PRESSURE: 68 MMHG | BODY MASS INDEX: 23 KG/M2 | RESPIRATION RATE: 16 BRPM | HEART RATE: 77 BPM | SYSTOLIC BLOOD PRESSURE: 128 MMHG | OXYGEN SATURATION: 99 %

## 2020-02-29 DIAGNOSIS — S20.211A RIB CONTUSION, RIGHT, INITIAL ENCOUNTER: ICD-10-CM
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
## 2020-02-29 DIAGNOSIS — R07.81 RIB PAIN ON RIGHT SIDE: Primary | ICD-10-CM

## 2020-02-29 LAB — B-HCG UR QL: NEGATIVE

## 2020-02-29 PROCEDURE — 99284 EMERGENCY DEPT VISIT MOD MDM: CPT | Mod: 25,ER

## 2020-02-29 PROCEDURE — 81025 URINE PREGNANCY TEST: CPT | Mod: ER

## 2020-02-29 RX ORDER — NAPROXEN 500 MG/1
500 TABLET ORAL 2 TIMES DAILY PRN
Qty: 30 TABLET | Refills: 0 | Status: SHIPPED | OUTPATIENT
Start: 2020-02-29 | End: 2020-10-14

## 2020-02-29 RX ORDER — CYCLOBENZAPRINE HCL 10 MG
10 TABLET ORAL 3 TIMES DAILY PRN
Qty: 15 TABLET | Refills: 0 | Status: SHIPPED | OUTPATIENT
Start: 2020-02-29 | End: 2020-03-05

## 2020-02-29 NOTE — DISCHARGE INSTRUCTIONS
Thank you for choosing [facility]! We appreciate you coming to us for your medical care. We hope you feel better soon! Please come back to [facility] for all of your future medical needs.    Our goal in the emergency department is to always give you outstanding care and exceptional service. You may receive a survey by mail or e-mail in the next week regarding your experience in our ED. We would greatly appreciate your completing and returning the survey. Your feedback provides us with a way to recognize our staff who give very good care and it helps us learn how to improve when your experience was below our aspiration of excellence.       Sincerely,    Sarabjit Banuelos MD  Medical Director  Emergency Department  [facility]

## 2020-03-01 NOTE — ED PROVIDER NOTES
"Encounter Date: 2/29/2020       History     Chief Complaint   Patient presents with    Fall     c/o right rib pain after falling onto [employer] tuesday at [facility]. No resp distress. Taking 800mg motrin at home.      HPI   This is a 29 y.o. female who has no past medical history on file.     The patient presents to the Emergency Department with pain to her right rib s/p falling on barricade Tuesday at [facility]. Pt states that she felt a "pop" and immediately had pain. Pain is moderate intensity, constant, worsening.  No respiratory distress, however painful to breathe.  Taking 800 mg of Motrin at this time.  Denies any other injury at this time.  Pain is aggravated by movement and inspiration, not relieved by anything.      Review of patient's allergies indicates:  No Known Allergies  History reviewed. No pertinent past medical history.  Past Surgical History:   Procedure Laterality Date    DILATION AND CURETTAGE OF UTERUS  09/2018    TONSILLECTOMY, ADENOIDECTOMY       Family History   Problem Relation Age of Onset    Colon cancer Paternal Grandmother     Prostate cancer Maternal Grandfather     Heart attack Maternal Grandfather     Diabetes Mother      Social History     Tobacco Use    Smoking status: Never Smoker    Smokeless tobacco: Never Used   Substance Use Topics    Alcohol use: No     Frequency: Never    Drug use: No     Review of Systems   Constitutional: Positive for activity change.   Respiratory: Positive for shortness of breath. Negative for cough.    Cardiovascular: Positive for chest pain (Right ribs).   Musculoskeletal: Negative for arthralgias.   Psychiatric/Behavioral: Positive for sleep disturbance.       Physical Exam     Initial Vitals [02/29/20 1436]   BP Pulse Resp Temp SpO2   129/67 98 18 97.5 °F (36.4 °C) 99 %      MAP       --         Physical Exam    Nursing note and vitals reviewed.  Constitutional: She appears well-developed and well-nourished. She is not diaphoretic. No " distress.   Patient lying on her left side in the stretcher   HENT:   Head: Normocephalic and atraumatic.   Mouth/Throat: Oropharynx is clear and moist.   Eyes: Conjunctivae are normal.   Pulmonary/Chest: No respiratory distress. She exhibits tenderness (Anterior axillary line near the costal margin, there is no palpable crepitus, swelling or deformity noted.  there is no flail segment.).   Musculoskeletal: Normal range of motion.   Neurological: She is alert and oriented to person, place, and time. She has normal strength. GCS score is 15. GCS eye subscore is 4. GCS verbal subscore is 5. GCS motor subscore is 6.   Skin: Skin is warm and dry. Capillary refill takes less than 2 seconds. No rash noted. No erythema.   Psychiatric: She has a normal mood and affect. Thought content normal.         ED Course   Procedures  Labs Reviewed   PREGNANCY TEST, URINE RAPID          Imaging Results          X-Ray Chest PA And Lateral (Final result)  Result time 02/29/20 17:05:46    Final result by Yohan Figueroa MD (02/29/20 17:05:46)                 Impression:      No acute findings.      Electronically signed by: Yohan Figueroa MD  Date:    02/29/2020  Time:    17:05             Narrative:    EXAMINATION:  XR CHEST PA AND LATERAL    CLINICAL HISTORY:  right rib injury;    TECHNIQUE:  PA and lateral views of the chest were performed.    COMPARISON:  04/24/2012    FINDINGS:  The cardiac and mediastinal silhouettes appear within normal limits.   The lungs are clear bilaterally.  No acute osseous findings demonstrated.                                 Medical Decision Making:   Initial Assessment:   This is an emergent evaluation of a 29 y.o.female patient with presentation of right rib pain s/p fall and direct trauma into a barricade.  She states that she felt a pop.  There is no obvious deformity on exam, however she is tender in the area of the anterior axillary line near the costal margin, around rib 10.     Initial differentials  include but are not limited to:  Rib contusion, fracture, doubt pneumothorax, doubt pulmonary contusion or pleural effusion.     Plan:  Chest x-ray, UPT, will write for muscle relaxer and nsaid    Clinical Tests:   Lab Tests: Ordered and Reviewed  Radiological Study: Ordered and Reviewed                   ED Course as of Mar 01 0627   Sat Feb 29, 2020   1610 CXR: This is a(n) PA and lateral chest exam with adequate penetration, positioning and good air entry. Trachea is midline, cardiac and mediastinal silhouettes are WNL.  There are no rib fractures identified.  There are no infiltrates, consolidations or effusions. Impression:  No acute process. This exam was independently interpreted by me.    [NP]   1610 Chest x-ray interpreted by meShows no acute abnormalities, specifically no fractures and no emergent findings such as pneumothorax.  No radiology interpretation at this time due to transmission issues.  Informed the patient of my initial interpretation and that any over read would be communicated back to them.  Nevertheless, I feel that the patient has a rib contusion or hairline fracture not clearly seen on x-ray.  This was explained to the patient.  Symptomatic care at this time including NSAIDs and muscle relaxers.  Heat or ice may also be use as needed.    Clinical impression and plan discussed with patient.    Pt is to call for follow up with PCP in 7 days.  Pt to return to the ED for any new or concerning symptoms.  Aftercare instructions and return precautions provided to patient.   Pt expressed understanding and agrees with plan.     [NP]   Sun Mar 01, 2020   0627 Radiology interpretation denotes no acute process.   X-Ray Chest PA And Lateral [NP]      ED Course User Index  [NP] Sarabjit Banuelos MD                Clinical Impression:       ICD-10-CM ICD-9-CM   1. Rib pain on right side R07.81 786.50   2. Fall, initial encounter W19.XXXA E888.9   3. Rib contusion, right, initial encounter S20.211A 922.1              ED Disposition Condition    Discharge Stable        ED Prescriptions     Medication Sig Dispense Start Date End Date Auth. Provider    naproxen (NAPROSYN) 500 MG tablet Take 1 tablet (500 mg total) by mouth 2 (two) times daily as needed (pain). 30 tablet 2/29/2020  Sarabjit Banuelos MD    cyclobenzaprine (FLEXERIL) 10 MG tablet Take 1 tablet (10 mg total) by mouth 3 (three) times daily as needed for Muscle spasms. 15 tablet 2/29/2020 3/5/2020 Sarabjit Banuelos MD        Follow-up Information    None                                    Sarabjit Banuelos MD  03/01/20 6194

## 2020-04-04 ENCOUNTER — HOSPITAL ENCOUNTER (EMERGENCY)
Facility: HOSPITAL | Age: 30
Discharge: HOME | End: 2020-04-04
Attending: EMERGENCY MEDICINE
Payer: MEDICAID

## 2020-04-04 VITALS
DIASTOLIC BLOOD PRESSURE: 66 MMHG | WEIGHT: 125 LBS | TEMPERATURE: 98 F | RESPIRATION RATE: 18 BRPM | SYSTOLIC BLOOD PRESSURE: 106 MMHG | BODY MASS INDEX: 23 KG/M2 | HEART RATE: 98 BPM | HEIGHT: 62 IN | OXYGEN SATURATION: 100 %

## 2020-04-04 DIAGNOSIS — R05.9 COUGH: ICD-10-CM

## 2020-04-04 DIAGNOSIS — U07.1 COVID-19 VIRUS DETECTED: Primary | ICD-10-CM

## 2020-04-04 LAB
B-HCG UR QL: NEGATIVE
GROUP A STREP, MOLECULAR: NEGATIVE
INFLUENZA A, MOLECULAR: NEGATIVE
INFLUENZA B, MOLECULAR: NEGATIVE
SARS-COV-2 RNA AMPLIFICATION, QUAL: POSITIVE
SPECIMEN SOURCE: NORMAL

## 2020-04-04 PROCEDURE — 25000003 PHARM REV CODE 250: Mod: ER | Performed by: PHYSICIAN ASSISTANT

## 2020-04-04 PROCEDURE — 87502 INFLUENZA DNA AMP PROBE: CPT | Mod: ER

## 2020-04-04 PROCEDURE — 87651 STREP A DNA AMP PROBE: CPT | Mod: ER

## 2020-04-04 PROCEDURE — U0002 COVID-19 LAB TEST NON-CDC: HCPCS | Mod: ER

## 2020-04-04 PROCEDURE — 99283 EMERGENCY DEPT VISIT LOW MDM: CPT | Mod: 25,ER

## 2020-04-04 PROCEDURE — 81025 URINE PREGNANCY TEST: CPT | Mod: ER

## 2020-04-04 RX ORDER — ACETAMINOPHEN 500 MG
1000 TABLET ORAL
Status: COMPLETED | OUTPATIENT
Start: 2020-04-04 | End: 2020-04-04

## 2020-04-04 RX ORDER — BENZONATATE 100 MG/1
100 CAPSULE ORAL 3 TIMES DAILY PRN
Qty: 10 CAPSULE | Refills: 0 | Status: SHIPPED | OUTPATIENT
Start: 2020-04-04 | End: 2020-04-04 | Stop reason: SDUPTHER

## 2020-04-04 RX ORDER — BENZONATATE 100 MG/1
100 CAPSULE ORAL 3 TIMES DAILY PRN
Qty: 10 CAPSULE | Refills: 0 | Status: SHIPPED | OUTPATIENT
Start: 2020-04-04 | End: 2020-04-14

## 2020-04-04 RX ADMIN — ACETAMINOPHEN 1000 MG: 500 TABLET ORAL at 06:04

## 2020-04-05 NOTE — ED NOTES
VS are stable at this time.   Pt AAOx 4, answers appropriately. RR= and not labored. NADN.  Skin is warm, dry, pink and intact. Pink MMM.  All questions and concerns addressed at this time.  Reviewed discharge instructions, Rx, isolation precautions, and quarantine with pt.  Discussed maintaining hydration and rest.  Education verbally given on worsening of symptoms and when to return to the ER.  Pt verbalized understanding.

## 2020-04-05 NOTE — ED PROVIDER NOTES
"Encounter Date: 4/4/2020       History     Chief Complaint   Patient presents with    cough and body aches     "I been having a migraine for 3 days and yesterday I started with a cough, body aches, and no smell or taste" denies fever or SOB     30-year-old female presents to the emergency department for evaluation of 3 day history of intermittent frontal headache, and yesterday began with nasal congestion, cough, and generalized body aches.  She reports that the symptoms have been intermittent since onset.  She reports that she has been taking her Fioricet at home for her headache with relief of symptoms, thinking it was just a migraine.  When she started with generalized body aches and a nonproductive cough, she decided to come get checked.  She denies any current headache, last dose of Fioricet was 9 o'clock this morning.  She denies any fever, dizziness, neck pain, chest pain, shortness of breath, abdominal pain, nausea, vomiting or diarrhea.  No treatment was attempted today prior to arrival.         Review of patient's allergies indicates:  No Known Allergies  No past medical history on file.  Past Surgical History:   Procedure Laterality Date    DILATION AND CURETTAGE OF UTERUS  09/2018    TONSILLECTOMY, ADENOIDECTOMY       Family History   Problem Relation Age of Onset    Colon cancer Paternal Grandmother     Prostate cancer Maternal Grandfather     Heart attack Maternal Grandfather     Diabetes Mother      Social History     Tobacco Use    Smoking status: Never Smoker    Smokeless tobacco: Never Used   Substance Use Topics    Alcohol use: No     Frequency: Never    Drug use: No     Review of Systems   Constitutional: Negative for activity change, appetite change and fever.   HENT: Positive for congestion, rhinorrhea and sore throat. Negative for trouble swallowing and voice change.    Eyes: Negative for photophobia and visual disturbance.   Respiratory: Positive for cough. Negative for choking, " chest tightness, shortness of breath and wheezing.    Cardiovascular: Negative for chest pain and palpitations.   Gastrointestinal: Negative for abdominal pain, constipation, diarrhea, nausea and vomiting.   Genitourinary: Negative for decreased urine volume and flank pain.   Musculoskeletal: Negative for back pain and neck pain.   Neurological: Positive for headaches (Is currently resolved). Negative for dizziness, syncope, weakness, light-headedness and numbness.       Physical Exam     Initial Vitals [04/04/20 1802]   BP Pulse Resp Temp SpO2   130/61 (!) 112 19 99 °F (37.2 °C) 99 %      MAP       --         Physical Exam    Nursing note and vitals reviewed.  Constitutional: She appears well-developed and well-nourished. She is not diaphoretic. No distress.   HENT:   Head: Normocephalic and atraumatic.   Right Ear: External ear normal.   Left Ear: External ear normal.   Nose: Nose normal.   Eyes: Conjunctivae and EOM are normal. Pupils are equal, round, and reactive to light.   Neck: Normal range of motion.   Cardiovascular: Normal rate, regular rhythm and normal heart sounds.   Pulmonary/Chest: No respiratory distress.   No respiratory distress or conversational dyspnea noted.  No accessory muscle use or audible wheezing noted.   Neurological: She is alert and oriented to person, place, and time.   Skin: Skin is dry.   Psychiatric: She has a normal mood and affect.         ED Course   Procedures  Labs Reviewed   SARS-COV-2 RNA AMPLIFICATION, QUAL - Abnormal; Notable for the following components:       Result Value    SARS-CoV-2 RNA, Amplification, Qual Positive (*)     All other components within normal limits   GROUP A STREP, MOLECULAR   INFLUENZA A & B BY MOLECULAR   PREGNANCY TEST, URINE RAPID          Imaging Results          X-Ray Chest AP Portable (Final result)  Result time 04/04/20 18:34:30    Final result by Dakota Camacho Jr., MD (04/04/20 18:34:30)                 Impression:      No acute  cardiopulmonary disease.      Electronically signed by: Dakota Camacho Jr., MD  Date:    04/04/2020  Time:    18:34             Narrative:    EXAMINATION:  XR CHEST AP PORTABLE    CLINICAL HISTORY:  Cough    COMPARISON:  02/29/2020    FINDINGS:  The lungs are clear. The cardiac silhouette and mediastinum are within normal limits. The remaining osseous structures and soft tissues are within normal limits.                                 Medical Decision Making:   Initial Assessment:   30-year-old female presents for evaluation of intermittent frontal headache, generalized body aches, cough and nasal congestion.  Physical exam reveals a nontoxic-appearing female in no acute distress.  Patient is afebrile vital signs within normal limits.  Neurological exam reveals an alert and oriented patient.No respiratory distress or conversational dyspnea noted.  No accessory muscle use or audible wheezing noted.  Patient ambulates well without hesitation or gait abnormality.  Differential Diagnosis:   Chest x-ray ordered to assess possible pneumonia or consolidation  Influenza  COVID-19  Streptococcal pharyngitis  Viral syndrome  ED Management:  UPT negative.  Group a strep negative.  Influenza negative.  Rapid COVID test and positive.  Chest x-ray report reveals no acute findings.  Discussed these findings at length with the patient verbalizes understanding and agreement course of treatment.  Patient was advises up for deeper 2 full weeks.  Instructed patient to return to the emergency department for any new or worsening symptoms                                  Clinical Impression:       ICD-10-CM ICD-9-CM   1. COVID-19 virus detected U07.1    2. Cough R05 786.2                                Rosa Blackwood PA-C  04/04/20 1937

## 2020-04-05 NOTE — DISCHARGE INSTRUCTIONS
You tested positive for COVID-19.  You are instructed to   Self-quarantine for 2 full weeks.  You are instructed to return to the emergency department for any new or worsening symptoms.

## 2020-10-14 ENCOUNTER — OFFICE VISIT (OUTPATIENT)
Dept: OBSTETRICS AND GYNECOLOGY | Facility: CLINIC | Age: 30
End: 2020-10-14
Payer: MEDICAID

## 2020-10-14 VITALS
WEIGHT: 129.88 LBS | BODY MASS INDEX: 23.75 KG/M2 | SYSTOLIC BLOOD PRESSURE: 118 MMHG | DIASTOLIC BLOOD PRESSURE: 82 MMHG

## 2020-10-14 DIAGNOSIS — Z12.4 SCREENING FOR CERVICAL CANCER: ICD-10-CM

## 2020-10-14 DIAGNOSIS — Z01.419 WELL WOMAN EXAM WITH ROUTINE GYNECOLOGICAL EXAM: Primary | ICD-10-CM

## 2020-10-14 DIAGNOSIS — N89.8 VAGINAL ODOR: ICD-10-CM

## 2020-10-14 PROCEDURE — 99395 PR PREVENTIVE VISIT,EST,18-39: ICD-10-PCS | Mod: S$PBB,,, | Performed by: OBSTETRICS & GYNECOLOGY

## 2020-10-14 PROCEDURE — 99999 PR PBB SHADOW E&M-EST. PATIENT-LVL III: ICD-10-PCS | Mod: PBBFAC,,, | Performed by: OBSTETRICS & GYNECOLOGY

## 2020-10-14 PROCEDURE — 99395 PREV VISIT EST AGE 18-39: CPT | Mod: S$PBB,,, | Performed by: OBSTETRICS & GYNECOLOGY

## 2020-10-14 PROCEDURE — 87624 HPV HI-RISK TYP POOLED RSLT: CPT

## 2020-10-14 PROCEDURE — 99999 PR PBB SHADOW E&M-EST. PATIENT-LVL III: CPT | Mod: PBBFAC,,, | Performed by: OBSTETRICS & GYNECOLOGY

## 2020-10-14 PROCEDURE — 99213 OFFICE O/P EST LOW 20 MIN: CPT | Mod: PBBFAC,PO | Performed by: OBSTETRICS & GYNECOLOGY

## 2020-10-14 PROCEDURE — 88175 CYTOPATH C/V AUTO FLUID REDO: CPT

## 2020-10-14 RX ORDER — METRONIDAZOLE 500 MG/1
500 TABLET ORAL EVERY 12 HOURS
Qty: 14 TABLET | Refills: 0 | Status: SHIPPED | OUTPATIENT
Start: 2020-10-14 | End: 2020-10-21

## 2020-10-14 NOTE — PROGRESS NOTES
GYNECOLOGY OFFICE NOTE    Reason for visit: annual    HPI: Pt is a 30 y.o.  female  who presents for annual. Cycle: menarche- 12, Interval-  Q month, Duration- 5 days, Flow- lighter with mirena in placed, denies dysmenorrhea. She is sexually active.  She does not desire STI screening. She reports vaginal discharge.  Last pap: , denies hx of abnormal.     History reviewed. No pertinent past medical history.    Past Surgical History:   Procedure Laterality Date    DILATION AND CURETTAGE OF UTERUS  2018    TONSILLECTOMY, ADENOIDECTOMY         Family History   Problem Relation Age of Onset    Colon cancer Paternal Grandmother     Prostate cancer Maternal Grandfather     Heart attack Maternal Grandfather     Diabetes Mother     Breast cancer Neg Hx     Ovarian cancer Neg Hx        Social History     Tobacco Use    Smoking status: Never Smoker    Smokeless tobacco: Never Used   Substance Use Topics    Alcohol use: No     Frequency: Never    Drug use: No       OB History    Para Term  AB Living   4 3 3   1 3   SAB TAB Ectopic Multiple Live Births     1     3      # Outcome Date GA Lbr Danny/2nd Weight Sex Delivery Anes PTL Lv   4 TAB            3 Term      Vag-Spont  N BENOIT   2 Term      Vag-Spont  N BENOIT   1 Term      Vag-Spont  N BENOIT       Current Outpatient Medications   Medication Sig    MIRENA 20 mcg/24 hr (5 years) IUD TO BE INSERTED ONE TIME BY PRESCRIBER. ROUTE INTRAUTERINE.    metroNIDAZOLE (FLAGYL) 500 MG tablet Take 1 tablet (500 mg total) by mouth every 12 (twelve) hours. No alcohol while taking medicine for 7 days     No current facility-administered medications for this visit.        Allergies: Patient has no known allergies.     /82   Wt 58.9 kg (129 lb 13.6 oz)   LMP 10/08/2020   BMI 23.75 kg/m²     ROS:  GENERAL: Denies fever or chills.   SKIN: Denies rash or lesions.   HEAD: Denies head injury or headache.   CHEST: Denies chest pain or shortness of  breath.   CARDIOVASCULAR: Denies palpitations or chest pain.   ABDOMEN: No constipation, diarrhea, nausea, vomiting or rectal bleeding.   URINARY: No dysuria, hematuria, or burning on urination.  REPRODUCTIVE: See HPI.   BREASTS: see HPI  NEUROLOGIC: Denies syncope or weakness.     Physical Exam:  GENERAL: alert, appears stated age and cooperative  NEUROLOGIC: orientated to person, place and time, normal mood and affect   CHEST: Normal respiratory effort  NECK: normal appearance  SKIN: no acne, hirsutism  BREAST EXAM: breasts appear normal, no suspicious masses, no skin or nipple changes or axillary nodes  ABDOMEN: abdomen is soft without significant tenderness, masses  EXTERNAL GENITALIA:  normal general appearance  URETHRA: normal urethra, normal urethral meatus  VAGINA:  normal mucosa, no  lesions, + discharge  CERVIX:  Normal IUD strings visualized  UTERUS:  mobile, non tender  ADNEXA: nontender    Diagnosis:  1. Well woman exam with routine gynecological exam    2. Screening for cervical cancer    3. Vaginal odor        Plan:   1. annual:   2. Pap/hpv today  3. F/u affirm- rx flagyl sent while awaiting results    Orders Placed This Encounter    HPV High Risk Genotypes, PCR    Vaginosis Screen by DNA Probe    Liquid-Based Pap Smear, Screening    metroNIDAZOLE (FLAGYL) 500 MG tablet         RTC in 1 year for annual      Patient was counseled today on the new ACS guidelines for cervical cytology screening as well as the current recommendations for breast cancer screening. She was counseled to follow up with her PCP for other routine health maintenance.       Brenda Howard MD  OB/GYN

## 2020-10-15 LAB
CANDIDA RRNA VAG QL PROBE: NEGATIVE
G VAGINALIS RRNA GENITAL QL PROBE: POSITIVE
T VAGINALIS RRNA GENITAL QL PROBE: NEGATIVE

## 2020-10-26 LAB
HPV HR 12 DNA SPEC QL NAA+PROBE: NEGATIVE
HPV16 AG SPEC QL: NEGATIVE
HPV18 DNA SPEC QL NAA+PROBE: NEGATIVE

## 2020-11-12 LAB
FINAL PATHOLOGIC DIAGNOSIS: NORMAL
Lab: NORMAL

## 2021-12-07 ENCOUNTER — OFFICE VISIT (OUTPATIENT)
Dept: OBSTETRICS AND GYNECOLOGY | Facility: CLINIC | Age: 31
End: 2021-12-07
Payer: MEDICAID

## 2021-12-07 VITALS
DIASTOLIC BLOOD PRESSURE: 72 MMHG | SYSTOLIC BLOOD PRESSURE: 110 MMHG | WEIGHT: 127.88 LBS | BODY MASS INDEX: 23.39 KG/M2

## 2021-12-07 DIAGNOSIS — Z30.432 ENCOUNTER FOR IUD REMOVAL: ICD-10-CM

## 2021-12-07 DIAGNOSIS — Z01.419 WELL WOMAN EXAM WITH ROUTINE GYNECOLOGICAL EXAM: Primary | ICD-10-CM

## 2021-12-07 DIAGNOSIS — N89.8 VAGINAL DISCHARGE: ICD-10-CM

## 2021-12-07 PROCEDURE — 99395 PREV VISIT EST AGE 18-39: CPT | Mod: S$PBB,,, | Performed by: OBSTETRICS & GYNECOLOGY

## 2021-12-07 PROCEDURE — 99999 PR PBB SHADOW E&M-EST. PATIENT-LVL III: CPT | Mod: PBBFAC,,, | Performed by: OBSTETRICS & GYNECOLOGY

## 2021-12-07 PROCEDURE — 87591 N.GONORRHOEAE DNA AMP PROB: CPT | Mod: 59 | Performed by: OBSTETRICS & GYNECOLOGY

## 2021-12-07 PROCEDURE — 58301 REMOVE INTRAUTERINE DEVICE: CPT | Mod: PBBFAC,PO | Performed by: OBSTETRICS & GYNECOLOGY

## 2021-12-07 PROCEDURE — 58301 REMOVE INTRAUTERINE DEVICE: CPT | Mod: S$PBB,,, | Performed by: OBSTETRICS & GYNECOLOGY

## 2021-12-07 PROCEDURE — 58301 PR REMOVE, INTRAUTERINE DEVICE: ICD-10-PCS | Mod: S$PBB,,, | Performed by: OBSTETRICS & GYNECOLOGY

## 2021-12-07 PROCEDURE — 99395 PR PREVENTIVE VISIT,EST,18-39: ICD-10-PCS | Mod: S$PBB,,, | Performed by: OBSTETRICS & GYNECOLOGY

## 2021-12-07 PROCEDURE — 99999 PR PBB SHADOW E&M-EST. PATIENT-LVL III: ICD-10-PCS | Mod: PBBFAC,,, | Performed by: OBSTETRICS & GYNECOLOGY

## 2021-12-07 PROCEDURE — 87661 TRICHOMONAS VAGINALIS AMPLIF: CPT | Mod: 59 | Performed by: OBSTETRICS & GYNECOLOGY

## 2021-12-07 PROCEDURE — 87491 CHLMYD TRACH DNA AMP PROBE: CPT | Mod: 59 | Performed by: OBSTETRICS & GYNECOLOGY

## 2021-12-07 PROCEDURE — 87481 CANDIDA DNA AMP PROBE: CPT | Mod: 59 | Performed by: OBSTETRICS & GYNECOLOGY

## 2021-12-07 PROCEDURE — 99213 OFFICE O/P EST LOW 20 MIN: CPT | Mod: PBBFAC,PO,25 | Performed by: OBSTETRICS & GYNECOLOGY

## 2021-12-07 RX ORDER — LEVONORGESTREL AND ETHINYL ESTRADIOL 0.1-0.02MG
1 KIT ORAL DAILY
Qty: 28 TABLET | Refills: 11 | Status: ON HOLD | OUTPATIENT
Start: 2021-12-07 | End: 2022-02-09

## 2021-12-07 RX ORDER — METRONIDAZOLE 500 MG/1
500 TABLET ORAL EVERY 12 HOURS
Qty: 14 TABLET | Refills: 0 | Status: SHIPPED | OUTPATIENT
Start: 2021-12-07 | End: 2021-12-14

## 2021-12-07 RX ORDER — FLUCONAZOLE 150 MG/1
TABLET ORAL
Qty: 2 TABLET | Refills: 1 | Status: ON HOLD | OUTPATIENT
Start: 2021-12-07 | End: 2022-02-09 | Stop reason: CLARIF

## 2021-12-10 LAB
BACTERIAL VAGINOSIS DNA: POSITIVE
CANDIDA GLABRATA DNA: NEGATIVE
CANDIDA KRUSEI DNA: NEGATIVE
CANDIDA RRNA VAG QL PROBE: NEGATIVE
T VAGINALIS RRNA GENITAL QL PROBE: NEGATIVE

## 2021-12-14 LAB
C TRACH DNA SPEC QL NAA+PROBE: DETECTED
N GONORRHOEA DNA SPEC QL NAA+PROBE: NOT DETECTED

## 2021-12-14 RX ORDER — AZITHROMYCIN 500 MG/1
1000 TABLET, FILM COATED ORAL ONCE
Qty: 2 TABLET | Refills: 1 | Status: SHIPPED | OUTPATIENT
Start: 2021-12-14 | End: 2021-12-14

## 2022-01-24 ENCOUNTER — TELEPHONE (OUTPATIENT)
Dept: OBSTETRICS AND GYNECOLOGY | Facility: CLINIC | Age: 32
End: 2022-01-24
Payer: MEDICAID

## 2022-01-24 NOTE — TELEPHONE ENCOUNTER
Returned call, pt states she needs to reschedule her pre op appt due to someone in the office also being out at that time. Pt rescheduled for 2/1 at 1415. .pbu

## 2022-01-24 NOTE — TELEPHONE ENCOUNTER
----- Message from Alexandra Flores sent at 1/24/2022  2:50 PM CST -----  Contact: [phone]  Who Called: PT  Regarding: appt questions    Would the patient rather a call back or a response via [employer]? Call back  Best Call Back Number: [phone]  Additional Information:

## 2022-02-01 ENCOUNTER — OFFICE VISIT (OUTPATIENT)
Dept: OBSTETRICS AND GYNECOLOGY | Facility: CLINIC | Age: 32
End: 2022-02-01
Payer: MEDICAID

## 2022-02-01 VITALS — DIASTOLIC BLOOD PRESSURE: 74 MMHG | SYSTOLIC BLOOD PRESSURE: 104 MMHG | BODY MASS INDEX: 25.2 KG/M2 | WEIGHT: 137.81 LBS

## 2022-02-01 DIAGNOSIS — Z30.09 UNWANTED FERTILITY: ICD-10-CM

## 2022-02-01 DIAGNOSIS — Z01.818 PREOP EXAMINATION: Primary | ICD-10-CM

## 2022-02-01 PROCEDURE — 99999 PR PBB SHADOW E&M-EST. PATIENT-LVL II: ICD-10-PCS | Mod: PBBFAC,,, | Performed by: OBSTETRICS & GYNECOLOGY

## 2022-02-01 PROCEDURE — 3078F PR MOST RECENT DIASTOLIC BLOOD PRESSURE < 80 MM HG: ICD-10-PCS | Mod: CPTII,,, | Performed by: OBSTETRICS & GYNECOLOGY

## 2022-02-01 PROCEDURE — 99213 OFFICE O/P EST LOW 20 MIN: CPT | Mod: S$PBB,,, | Performed by: OBSTETRICS & GYNECOLOGY

## 2022-02-01 PROCEDURE — 3074F SYST BP LT 130 MM HG: CPT | Mod: CPTII,,, | Performed by: OBSTETRICS & GYNECOLOGY

## 2022-02-01 PROCEDURE — 3008F BODY MASS INDEX DOCD: CPT | Mod: CPTII,,, | Performed by: OBSTETRICS & GYNECOLOGY

## 2022-02-01 PROCEDURE — 99212 OFFICE O/P EST SF 10 MIN: CPT | Mod: PBBFAC,PO | Performed by: OBSTETRICS & GYNECOLOGY

## 2022-02-01 PROCEDURE — 1159F MED LIST DOCD IN RCRD: CPT | Mod: CPTII,,, | Performed by: OBSTETRICS & GYNECOLOGY

## 2022-02-01 PROCEDURE — 99999 PR PBB SHADOW E&M-EST. PATIENT-LVL II: CPT | Mod: PBBFAC,,, | Performed by: OBSTETRICS & GYNECOLOGY

## 2022-02-01 PROCEDURE — 1160F RVW MEDS BY RX/DR IN RCRD: CPT | Mod: CPTII,,, | Performed by: OBSTETRICS & GYNECOLOGY

## 2022-02-01 PROCEDURE — 3078F DIAST BP <80 MM HG: CPT | Mod: CPTII,,, | Performed by: OBSTETRICS & GYNECOLOGY

## 2022-02-01 PROCEDURE — 3008F PR BODY MASS INDEX (BMI) DOCUMENTED: ICD-10-PCS | Mod: CPTII,,, | Performed by: OBSTETRICS & GYNECOLOGY

## 2022-02-01 PROCEDURE — 1159F PR MEDICATION LIST DOCUMENTED IN MEDICAL RECORD: ICD-10-PCS | Mod: CPTII,,, | Performed by: OBSTETRICS & GYNECOLOGY

## 2022-02-01 PROCEDURE — 1160F PR REVIEW ALL MEDS BY PRESCRIBER/CLIN PHARMACIST DOCUMENTED: ICD-10-PCS | Mod: CPTII,,, | Performed by: OBSTETRICS & GYNECOLOGY

## 2022-02-01 PROCEDURE — 3074F PR MOST RECENT SYSTOLIC BLOOD PRESSURE < 130 MM HG: ICD-10-PCS | Mod: CPTII,,, | Performed by: OBSTETRICS & GYNECOLOGY

## 2022-02-01 PROCEDURE — 99213 PR OFFICE/OUTPT VISIT, EST, LEVL III, 20-29 MIN: ICD-10-PCS | Mod: S$PBB,,, | Performed by: OBSTETRICS & GYNECOLOGY

## 2022-02-01 RX ORDER — SODIUM CHLORIDE 9 MG/ML
INJECTION, SOLUTION INTRAVENOUS CONTINUOUS
Status: CANCELLED | OUTPATIENT
Start: 2022-02-01

## 2022-02-01 RX ORDER — MUPIROCIN 20 MG/G
OINTMENT TOPICAL
Status: CANCELLED | OUTPATIENT
Start: 2022-02-01

## 2022-02-01 NOTE — PROGRESS NOTES
REESE Pre-op Exam      HPI : Chanelle Almazan is a 31 y.o. female  for preop appointment for Bilateral salpingectomy secondary to undesired fertility. Surgery scheduled for 2022. We discussed that she is scheduled for a salpingectomy and not a tubal ligation. This is irreversible. She is aware of alternative forms of contraception. The use of hormonal contraception has been fully discussed with the patient. We discussed all options including OCPs, transdermal patches, vaginal ring, Depo Provera injections, Nexplanon, and IUD.  She understands and wishes to continue with bilateral salpingectomy. Laparoscopic COUNSELING: The pros, cons, risks, benefits and alternatives all laparoscopic surgery were discussed.  The potential for injury to bowel, bladder, blood vessel and ureter was discussed.  The possible need for a blood transfusion discussed. The potential need for more surgery was discussed.  The possible need to do a repair of the ureter and/or bowel and the need to open the abdomen was discussed.  The patient was informed that if the abdomen needed to be opened, that the incision might be a midline not a Pfannenstiel incision. The patients's potential pathology was discussed and her theraputic options reviewed. Pt ok with laparotomy if necessary to complete salpingectomy.     History reviewed. No pertinent past medical history.  Past Surgical History:   Procedure Laterality Date    DILATION AND CURETTAGE OF UTERUS  2018    TONSILLECTOMY, ADENOIDECTOMY       Family History   Problem Relation Age of Onset    Colon cancer Paternal Grandmother     Prostate cancer Maternal Grandfather     Heart attack Maternal Grandfather     Diabetes Mother     Breast cancer Neg Hx     Ovarian cancer Neg Hx      Social History     Tobacco Use    Smoking status: Never Smoker    Smokeless tobacco: Never Used   Substance Use Topics    Alcohol use: No    Drug use: No     OB History    Para Term  AB  Living   4 3 3   1 3   SAB IAB Ectopic Multiple Live Births     1     3      # Outcome Date GA Lbr Danny/2nd Weight Sex Delivery Anes PTL Lv   4 IAB            3 Term      Vag-Spont  N BENOIT   2 Term      Vag-Spont  N BENOIT   1 Term      Vag-Spont  N BENOIT       /74   Wt 62.5 kg (137 lb 12.6 oz)   LMP 01/26/2022 (Approximate)   BMI 25.20 kg/m²     ROS:  GENERAL: Feeling well overall.   SKIN: Denies rash or lesions.   HEAD: Denies head injury or headache.   NODES: Denies enlarged lymph nodes.   CHEST: Denies chest pain or shortness of breath.   CARDIOVASCULAR: Denies palpitation  ABDOMEN: No abdominal pain, constipation, diarrhea, nausea, vomiting or rectal bleeding.   URINARY: No frequency, dysuria, hematuria, or burning on urination.  REPRODUCTIVE: See HPI.   BREASTS: Denies pain, lumps, or nipple discharge.   HEMATOLOGIC: No easy bruisability.  MUSCULOSKELETAL: Denies joint pain or swelling.   NEUROLOGIC: Denies syncope or weakness.   PSYCHIATRIC: Denies depression, anxiety or mood swings.      Physical Exam:  GENERAL: alert, appears stated age and cooperative  NEUROLOGIC: orientated to person, place and time, normal mood and affect   CHEST: Normal respiratory effort  NECK: normal appearance  SKIN: no acne, striae, hirsutism  ABDOMEN: abdomen is soft without significant tenderness  PELVIC: deferred      ASSESSMENT & PLAN:  1. Preop examination      2. Unwanted fertility        I have discussed the risks, benefits, indications, and alternatives of the procedure in detail.  The patient verbalizes her understanding.  All questions answered.  Consents signed.  The patient agrees to proceed to proceed as planned: Bilateral salpingectomy on 02/09/2022       Face to Face time with patient: 30 minutes of total time spent on the encounter, which includes face to face time and non-face to face time preparing to see the patient (eg, review of tests), Obtaining and/or reviewing separately obtained history, Documenting  clinical information in the electronic or other health record, Independently interpreting results (not separately reported) and communicating results to the patient/family/caregiver, or Care coordination (not separately reported).       Brenda Howard MD  OB/GYN

## 2022-02-06 ENCOUNTER — LAB VISIT (OUTPATIENT)
Dept: URGENT CARE | Facility: CLINIC | Age: 32
End: 2022-02-06
Payer: MEDICAID

## 2022-02-06 DIAGNOSIS — Z20.822 ENCOUNTER FOR LABORATORY TESTING FOR COVID-19 VIRUS: ICD-10-CM

## 2022-02-06 PROCEDURE — U0005 INFEC AGEN DETEC AMPLI PROBE: HCPCS | Performed by: OBSTETRICS & GYNECOLOGY

## 2022-02-06 PROCEDURE — U0003 INFECTIOUS AGENT DETECTION BY NUCLEIC ACID (DNA OR RNA); SEVERE ACUTE RESPIRATORY SYNDROME CORONAVIRUS 2 (SARS-COV-2) (CORONAVIRUS DISEASE [COVID-19]), AMPLIFIED PROBE TECHNIQUE, MAKING USE OF HIGH THROUGHPUT TECHNOLOGIES AS DESCRIBED BY CMS-2020-01-R: HCPCS | Performed by: OBSTETRICS & GYNECOLOGY

## 2022-02-07 LAB
SARS-COV-2 RNA RESP QL NAA+PROBE: NOT DETECTED
SARS-COV-2- CYCLE NUMBER: NORMAL

## 2022-02-09 ENCOUNTER — ANESTHESIA EVENT (OUTPATIENT)
Dept: SURGERY | Facility: HOSPITAL | Age: 32
End: 2022-02-09
Payer: MEDICAID

## 2022-02-09 ENCOUNTER — HOSPITAL ENCOUNTER (OUTPATIENT)
Facility: HOSPITAL | Age: 32
Discharge: HOME | End: 2022-02-09
Attending: OBSTETRICS & GYNECOLOGY | Admitting: OBSTETRICS & GYNECOLOGY
Payer: MEDICAID

## 2022-02-09 ENCOUNTER — ANESTHESIA (OUTPATIENT)
Dept: SURGERY | Facility: HOSPITAL | Age: 32
End: 2022-02-09
Payer: MEDICAID

## 2022-02-09 VITALS
WEIGHT: 127 LBS | TEMPERATURE: 99 F | DIASTOLIC BLOOD PRESSURE: 56 MMHG | BODY MASS INDEX: 23.37 KG/M2 | HEART RATE: 86 BPM | RESPIRATION RATE: 15 BRPM | SYSTOLIC BLOOD PRESSURE: 99 MMHG | HEIGHT: 62 IN | OXYGEN SATURATION: 100 %

## 2022-02-09 DIAGNOSIS — Z30.09 UNWANTED FERTILITY: ICD-10-CM

## 2022-02-09 PROCEDURE — 36000709 HC OR TIME LEV III EA ADD 15 MIN: Performed by: OBSTETRICS & GYNECOLOGY

## 2022-02-09 PROCEDURE — 27201423 OPTIME MED/SURG SUP & DEVICES STERILE SUPPLY: Performed by: OBSTETRICS & GYNECOLOGY

## 2022-02-09 PROCEDURE — 71000015 HC POSTOP RECOV 1ST HR: Performed by: OBSTETRICS & GYNECOLOGY

## 2022-02-09 PROCEDURE — 25000003 PHARM REV CODE 250: Performed by: NURSE ANESTHETIST, CERTIFIED REGISTERED

## 2022-02-09 PROCEDURE — 00840 ANES IPER PX LOWER ABD NOS: CPT | Performed by: OBSTETRICS & GYNECOLOGY

## 2022-02-09 PROCEDURE — 58661 PR LAP,RMV  ADNEXAL STRUCTURE: ICD-10-PCS | Mod: 50,,, | Performed by: OBSTETRICS & GYNECOLOGY

## 2022-02-09 PROCEDURE — 63600175 PHARM REV CODE 636 W HCPCS: Performed by: NURSE ANESTHETIST, CERTIFIED REGISTERED

## 2022-02-09 PROCEDURE — 71000033 HC RECOVERY, INTIAL HOUR: Performed by: OBSTETRICS & GYNECOLOGY

## 2022-02-09 PROCEDURE — 88302 TISSUE EXAM BY PATHOLOGIST: CPT | Performed by: PATHOLOGY

## 2022-02-09 PROCEDURE — 36000708 HC OR TIME LEV III 1ST 15 MIN: Performed by: OBSTETRICS & GYNECOLOGY

## 2022-02-09 PROCEDURE — 63600175 PHARM REV CODE 636 W HCPCS: Performed by: ANESTHESIOLOGY

## 2022-02-09 PROCEDURE — 88302 PR  SURG PATH,LEVEL II: ICD-10-PCS | Mod: 26,,, | Performed by: PATHOLOGY

## 2022-02-09 PROCEDURE — 25000003 PHARM REV CODE 250: Performed by: OBSTETRICS & GYNECOLOGY

## 2022-02-09 PROCEDURE — 58661 LAPAROSCOPY REMOVE ADNEXA: CPT | Mod: 50,,, | Performed by: OBSTETRICS & GYNECOLOGY

## 2022-02-09 PROCEDURE — 71000039 HC RECOVERY, EACH ADD'L HOUR: Performed by: OBSTETRICS & GYNECOLOGY

## 2022-02-09 PROCEDURE — 37000009 HC ANESTHESIA EA ADD 15 MINS: Performed by: OBSTETRICS & GYNECOLOGY

## 2022-02-09 PROCEDURE — 71000016 HC POSTOP RECOV ADDL HR: Performed by: OBSTETRICS & GYNECOLOGY

## 2022-02-09 PROCEDURE — 37000008 HC ANESTHESIA 1ST 15 MINUTES: Performed by: OBSTETRICS & GYNECOLOGY

## 2022-02-09 PROCEDURE — 88302 TISSUE EXAM BY PATHOLOGIST: CPT | Mod: 26,,, | Performed by: PATHOLOGY

## 2022-02-09 RX ORDER — LIDOCAINE HYDROCHLORIDE 20 MG/ML
INJECTION, SOLUTION EPIDURAL; INFILTRATION; INTRACAUDAL; PERINEURAL
Status: DISCONTINUED | OUTPATIENT
Start: 2022-02-09 | End: 2022-02-09

## 2022-02-09 RX ORDER — NEOSTIGMINE METHYLSULFATE 1 MG/ML
INJECTION, SOLUTION INTRAVENOUS
Status: DISCONTINUED | OUTPATIENT
Start: 2022-02-09 | End: 2022-02-09

## 2022-02-09 RX ORDER — PROCHLORPERAZINE EDISYLATE 5 MG/ML
5 INJECTION INTRAMUSCULAR; INTRAVENOUS EVERY 6 HOURS PRN
Status: CANCELLED | OUTPATIENT
Start: 2022-02-09

## 2022-02-09 RX ORDER — MUPIROCIN 20 MG/G
OINTMENT TOPICAL
Status: DISCONTINUED | OUTPATIENT
Start: 2022-02-09 | End: 2022-02-09 | Stop reason: HOSPADM

## 2022-02-09 RX ORDER — ONDANSETRON 2 MG/ML
INJECTION INTRAMUSCULAR; INTRAVENOUS
Status: DISCONTINUED | OUTPATIENT
Start: 2022-02-09 | End: 2022-02-09

## 2022-02-09 RX ORDER — KETOROLAC TROMETHAMINE 30 MG/ML
INJECTION, SOLUTION INTRAMUSCULAR; INTRAVENOUS
Status: DISCONTINUED | OUTPATIENT
Start: 2022-02-09 | End: 2022-02-09

## 2022-02-09 RX ORDER — HYDROCODONE BITARTRATE AND ACETAMINOPHEN 5; 325 MG/1; MG/1
1 TABLET ORAL EVERY 4 HOURS PRN
Status: DISCONTINUED | OUTPATIENT
Start: 2022-02-09 | End: 2022-02-09 | Stop reason: HOSPADM

## 2022-02-09 RX ORDER — SODIUM CHLORIDE 0.9 % (FLUSH) 0.9 %
10 SYRINGE (ML) INJECTION
Status: DISCONTINUED | OUTPATIENT
Start: 2022-02-09 | End: 2022-02-09 | Stop reason: HOSPADM

## 2022-02-09 RX ORDER — ONDANSETRON 2 MG/ML
4 INJECTION INTRAMUSCULAR; INTRAVENOUS ONCE AS NEEDED
Status: DISCONTINUED | OUTPATIENT
Start: 2022-02-09 | End: 2022-02-09 | Stop reason: HOSPADM

## 2022-02-09 RX ORDER — DEXAMETHASONE SODIUM PHOSPHATE 4 MG/ML
INJECTION, SOLUTION INTRA-ARTICULAR; INTRALESIONAL; INTRAMUSCULAR; INTRAVENOUS; SOFT TISSUE
Status: DISCONTINUED | OUTPATIENT
Start: 2022-02-09 | End: 2022-02-09

## 2022-02-09 RX ORDER — SODIUM CHLORIDE 9 MG/ML
INJECTION, SOLUTION INTRAVENOUS CONTINUOUS
Status: DISCONTINUED | OUTPATIENT
Start: 2022-02-09 | End: 2022-02-09 | Stop reason: HOSPADM

## 2022-02-09 RX ORDER — PROPOFOL 10 MG/ML
VIAL (ML) INTRAVENOUS
Status: DISCONTINUED | OUTPATIENT
Start: 2022-02-09 | End: 2022-02-09

## 2022-02-09 RX ORDER — DIPHENHYDRAMINE HCL 25 MG
25 CAPSULE ORAL EVERY 4 HOURS PRN
Status: DISCONTINUED | OUTPATIENT
Start: 2022-02-09 | End: 2022-02-09 | Stop reason: HOSPADM

## 2022-02-09 RX ORDER — CEFAZOLIN SODIUM 2 G/50ML
2 SOLUTION INTRAVENOUS
Status: DISCONTINUED | OUTPATIENT
Start: 2022-02-09 | End: 2022-02-09 | Stop reason: HOSPADM

## 2022-02-09 RX ORDER — DIPHENHYDRAMINE HYDROCHLORIDE 50 MG/ML
25 INJECTION INTRAMUSCULAR; INTRAVENOUS EVERY 4 HOURS PRN
Status: DISCONTINUED | OUTPATIENT
Start: 2022-02-09 | End: 2022-02-09 | Stop reason: HOSPADM

## 2022-02-09 RX ORDER — IBUPROFEN 600 MG/1
600 TABLET ORAL EVERY 6 HOURS PRN
Qty: 30 TABLET | Refills: 0 | OUTPATIENT
Start: 2022-02-09 | End: 2022-12-20

## 2022-02-09 RX ORDER — ROCURONIUM BROMIDE 10 MG/ML
INJECTION, SOLUTION INTRAVENOUS
Status: DISCONTINUED | OUTPATIENT
Start: 2022-02-09 | End: 2022-02-09

## 2022-02-09 RX ORDER — ONDANSETRON 8 MG/1
8 TABLET, ORALLY DISINTEGRATING ORAL EVERY 8 HOURS PRN
Status: CANCELLED | OUTPATIENT
Start: 2022-02-09

## 2022-02-09 RX ORDER — HYDROMORPHONE HYDROCHLORIDE 2 MG/ML
0.5 INJECTION, SOLUTION INTRAMUSCULAR; INTRAVENOUS; SUBCUTANEOUS EVERY 5 MIN PRN
Status: DISCONTINUED | OUTPATIENT
Start: 2022-02-09 | End: 2022-02-09 | Stop reason: HOSPADM

## 2022-02-09 RX ORDER — CEFAZOLIN SODIUM 1 G/3ML
INJECTION, POWDER, FOR SOLUTION INTRAMUSCULAR; INTRAVENOUS
Status: DISCONTINUED | OUTPATIENT
Start: 2022-02-09 | End: 2022-02-09

## 2022-02-09 RX ORDER — HYDROCODONE BITARTRATE AND ACETAMINOPHEN 5; 325 MG/1; MG/1
1 TABLET ORAL EVERY 12 HOURS PRN
Qty: 10 TABLET | Refills: 0 | Status: SHIPPED | OUTPATIENT
Start: 2022-02-09 | End: 2023-02-14

## 2022-02-09 RX ORDER — IBUPROFEN 600 MG/1
600 TABLET ORAL EVERY 6 HOURS PRN
Status: DISCONTINUED | OUTPATIENT
Start: 2022-02-09 | End: 2022-02-09 | Stop reason: HOSPADM

## 2022-02-09 RX ORDER — FENTANYL CITRATE 50 UG/ML
INJECTION, SOLUTION INTRAMUSCULAR; INTRAVENOUS
Status: DISCONTINUED | OUTPATIENT
Start: 2022-02-09 | End: 2022-02-09

## 2022-02-09 RX ORDER — ACETAMINOPHEN 10 MG/ML
INJECTION, SOLUTION INTRAVENOUS
Status: DISCONTINUED | OUTPATIENT
Start: 2022-02-09 | End: 2022-02-09

## 2022-02-09 RX ORDER — SUCCINYLCHOLINE CHLORIDE 20 MG/ML
INJECTION INTRAMUSCULAR; INTRAVENOUS
Status: DISCONTINUED | OUTPATIENT
Start: 2022-02-09 | End: 2022-02-09

## 2022-02-09 RX ADMIN — ONDANSETRON 8 MG: 2 INJECTION, SOLUTION INTRAMUSCULAR; INTRAVENOUS at 11:02

## 2022-02-09 RX ADMIN — KETOROLAC TROMETHAMINE 30 MG: 30 INJECTION, SOLUTION INTRAMUSCULAR; INTRAVENOUS at 12:02

## 2022-02-09 RX ADMIN — HYDROMORPHONE HYDROCHLORIDE 0.5 MG: 2 INJECTION INTRAMUSCULAR; INTRAVENOUS; SUBCUTANEOUS at 12:02

## 2022-02-09 RX ADMIN — FENTANYL CITRATE 150 MCG: 50 INJECTION, SOLUTION INTRAMUSCULAR; INTRAVENOUS at 11:02

## 2022-02-09 RX ADMIN — ACETAMINOPHEN 1000 MG: 10 INJECTION, SOLUTION INTRAVENOUS at 11:02

## 2022-02-09 RX ADMIN — ROCURONIUM BROMIDE 5 MG: 10 INJECTION, SOLUTION INTRAVENOUS at 11:02

## 2022-02-09 RX ADMIN — CEFAZOLIN 2 G: 330 INJECTION, POWDER, FOR SOLUTION INTRAMUSCULAR; INTRAVENOUS at 11:02

## 2022-02-09 RX ADMIN — ROCURONIUM BROMIDE 15 MG: 10 INJECTION, SOLUTION INTRAVENOUS at 11:02

## 2022-02-09 RX ADMIN — SUCCINYLCHOLINE CHLORIDE 140 MG: 20 INJECTION, SOLUTION INTRAMUSCULAR; INTRAVENOUS at 11:02

## 2022-02-09 RX ADMIN — MUPIROCIN: 20 OINTMENT TOPICAL at 09:02

## 2022-02-09 RX ADMIN — LIDOCAINE HYDROCHLORIDE 100 MG: 20 INJECTION, SOLUTION EPIDURAL; INFILTRATION; INTRACAUDAL; PERINEURAL at 11:02

## 2022-02-09 RX ADMIN — PROPOFOL 130 MG: 10 INJECTION, EMULSION INTRAVENOUS at 11:02

## 2022-02-09 RX ADMIN — HYDROMORPHONE HYDROCHLORIDE 0.5 MG: 2 INJECTION INTRAMUSCULAR; INTRAVENOUS; SUBCUTANEOUS at 01:02

## 2022-02-09 RX ADMIN — NEOSTIGMINE METHYLSULFATE 4 MG: 1 INJECTION INTRAVENOUS at 12:02

## 2022-02-09 RX ADMIN — SODIUM CHLORIDE, SODIUM LACTATE, POTASSIUM CHLORIDE, AND CALCIUM CHLORIDE: .6; .31; .03; .02 INJECTION, SOLUTION INTRAVENOUS at 11:02

## 2022-02-09 RX ADMIN — HYDROCODONE BITARTRATE AND ACETAMINOPHEN 1 TABLET: 5; 325 TABLET ORAL at 01:02

## 2022-02-09 RX ADMIN — GLYCOPYRROLATE 0.6 MG: 0.2 INJECTION, SOLUTION INTRAMUSCULAR; INTRAVITREAL at 12:02

## 2022-02-09 RX ADMIN — DEXAMETHASONE SODIUM PHOSPHATE 8 MG: 4 INJECTION, SOLUTION INTRA-ARTICULAR; INTRALESIONAL; INTRAMUSCULAR; INTRAVENOUS; SOFT TISSUE at 11:02

## 2022-02-09 RX ADMIN — FENTANYL CITRATE 100 MCG: 50 INJECTION, SOLUTION INTRAMUSCULAR; INTRAVENOUS at 12:02

## 2022-02-09 NOTE — ANESTHESIA POSTPROCEDURE EVALUATION
Anesthesia Post Evaluation    Patient: Chanelle Almazan    Procedure(s) Performed: Procedure(s) (LRB):  SALPINGECTOMY, LAPAROSCOPIC (Bilateral)    Final Anesthesia Type: general      Patient location during evaluation: PACU  Level of consciousness: sedated  Post-procedure vital signs: reviewed and stable  Pain management: adequate  Airway patency: patent                    Vitals Value Taken Time   /64 02/09/22 1241   Temp 36.7 °C (98.1 °F) 02/09/22 1235   Pulse 64 02/09/22 1242   Resp 18 02/09/22 1242   SpO2 100 % 02/09/22 1242   Vitals shown include unvalidated device data.      No case tracking events are documented in the log.      Pain/Cipriano Score: Cipriano Score: 8 (2/9/2022 12:40 PM)

## 2022-02-09 NOTE — ANESTHESIA PREPROCEDURE EVALUATION
02/09/2022  Chanelle Almazan is a 31 y.o., female.    Anesthesia Evaluation     I have reviewed the Nursing Notes.    I have reviewed the Medications.     Review of Systems  Anesthesia Hx:  No problems with previous Anesthesia Denies Hx of Anesthetic complications Denies Family Hx of Anesthesia complications.    Social:  Non-Smoker, No Alcohol Use    Hematology/Oncology:  Hematology Normal   Oncology Normal     EENT/Dental:EENT/Dental Normal   Cardiovascular:  Cardiovascular Normal Exercise tolerance: good     Pulmonary:  Pulmonary Normal    Renal/:  Renal/ Normal     Hepatic/GI:  Hepatic/GI Normal    Musculoskeletal:  Musculoskeletal Normal    Neurological:  Neurology Normal    Endocrine:  Endocrine Normal        Physical Exam  General:  Well nourished    Airway/Jaw/Neck:  Airway Findings: Mouth Opening: Normal Tongue: Normal  General Airway Assessment: Adult  Mallampati: II  TM Distance: Normal, at least 6 cm  Jaw/Neck Findings:     Neck ROM: Normal ROM      Dental:  Dental Findings: In tact        Mental Status:  Mental Status Findings:  Cooperative, Alert and Oriented         Anesthesia Plan  Type of Anesthesia, risks & benefits discussed:  Anesthesia Type:  general    Patient's Preference: MAC  Plan Factors:          Intra-op Monitoring Plan:   Intra-op Monitoring Plan Comments:   Post Op Pain Control Plan:   Post Op Pain Control Plan Comments:     Induction:   IV  Beta Blocker:         Informed Consent: Patient understands risks and agrees with Anesthesia plan.  Questions answered. Anesthesia consent signed with patient.  ASA Score: 2     Day of Surgery Review of History & Physical:            Ready For Surgery From Anesthesia Perspective.

## 2022-02-09 NOTE — TRANSFER OF CARE
"Anesthesia Transfer of Care Note    Patient: Chanelle Almazan    Procedure(s) Performed: Procedure(s) (LRB):  SALPINGECTOMY, LAPAROSCOPIC (Bilateral)    Patient location: PACU    Anesthesia Type: general    Transport from OR: Transported from OR on 6-10 L/min O2 by face mask with adequate spontaneous ventilation    Post pain: adequate analgesia    Post assessment: no apparent anesthetic complications    Post vital signs: stable    Level of consciousness: sedated    Nausea/Vomiting: no nausea/vomiting    Complications: none    Transfer of care protocol was followed      Last vitals:   Visit Vitals  /60 (BP Location: Right arm, Patient Position: Lying)   Pulse 78   Temp 37.2 °C (99 °F) (Skin)   Resp 17   Ht 5' 2" (1.575 m)   Wt 57.6 kg (127 lb)   LMP 01/26/2022 (Approximate)   SpO2 98%   Breastfeeding No   BMI 23.23 kg/m²     "

## 2022-02-09 NOTE — INTERVAL H&P NOTE
The patient has been seen and the H&P has been reviewed: No interval changes since last clinic visit.  Proceed to OR for scheduled procedure: Bilateral salpingectomy    Brenda Howard MD  OB/GYN  ;          Active Hospital Problems    Diagnosis  POA    *Unwanted fertility [Z30.09]  Yes      Resolved Hospital Problems   No resolved problems to display.

## 2022-02-09 NOTE — DISCHARGE SUMMARY
[facility] ([facility])  Obstetrics & Gynecology  Discharge Summary    Patient Name: Chanelle Almazan  MRN: [id number]  Admission Date: 2/9/2022  Hospital Length of Stay: 0 days  Discharge Date:  02/09/2022   Attending Physician: Brenda Howard MD   Discharging Provider: Brenda Howard MD  Primary Care Provider: Asya Rivas MD    Hospital Course: Patient was admitted for an outpatient procedure (Bilateral salpingectomy secondary to undesired fertilty) and tolerated the procedure well with no complications. Please see operative report for further details. Following the procedure the patient was awakened from anesthesia and transferred to the recovery area in stable condition. Patient was discharged to home once ambulating, voiding, tolerating clear liquids and pain well controlled. Patient given routine post-op instructions and prescriptions for pain medication to take as needed. Patient instructed to follow up with me in 4 weeks for postoperative appointment.       Procedure(s) (LRB):  SALPINGECTOMY, LAPAROSCOPIC (Bilateral)         Pending Diagnostic Studies:     Procedure Component Value Units Date/Time    Specimen to Pathology, Surgery Gynecology and Obstetrics [[id number]] Collected: 02/09/22 1216    Order Status: Sent Lab Status: In process Updated: 02/09/22 1219        Final Active Diagnoses:    Diagnosis Date Noted POA    PRINCIPAL PROBLEM:  Unwanted fertility [Z30.09] 02/01/2022 Yes      Problems Resolved During this Admission:        Discharged Condition: good    Disposition: Home or Self Care    Follow Up:   Follow-up Information     Brenda Howard MD In 4 weeks.    Specialties: Obstetrics, Obstetrics and Gynecology  Why: Postoperative visit  Contact information:  [address]  [address] [place] [id number]  [phone]                       Patient Instructions:      Diet Adult Regular     Pelvic Rest   Order Comments: Pelvic rest- no sex, tampons, douching. Nothing in the vagina x 2 weeks      Notify your health care provider if you experience any of the following:  temperature >100.4     Notify your health care provider if you experience any of the following:  persistent nausea and vomiting or diarrhea     Notify your health care provider if you experience any of the following:  severe uncontrolled pain     Notify your health care provider if you experience any of the following:  redness, tenderness, or signs of infection (pain, swelling, redness, odor or green/yellow discharge around incision site)     Notify your health care provider if you experience any of the following:  difficulty breathing or increased cough     Notify your health care provider if you experience any of the following:  severe persistent headache     Notify your health care provider if you experience any of the following:  worsening rash     Notify your health care provider if you experience any of the following:  persistent dizziness, light-headedness, or visual disturbances     Notify your health care provider if you experience any of the following:  increased confusion or weakness     Activity as tolerated     Medications:  Reconciled Home Medications:      Medication List      START taking these medications    HYDROcodone-acetaminophen 5-325 mg per tablet  Commonly known as: NORCO  Take 1 tablet by mouth every 12 (twelve) hours as needed for Pain.     ibuprofen 600 MG tablet  Commonly known as: ADVIL,MOTRIN  Take 1 tablet (600 mg total) by mouth every 6 (six) hours as needed for Pain.        STOP taking these medications    levonorgestrel-ethinyl estradiol 0.1-20 mg-mcg per tablet  Commonly known as: BIJAN ESCALANTE LESSINA Sheena M Pullman, MD  Obstetrics & Gynecology  [place] [facility] ([facility])

## 2022-02-09 NOTE — DISCHARGE INSTRUCTIONS
Patient Education       Tubal Ligation, Laparoscopic Surgery Discharge Instructions   About this topic   A tubal ligation is a long-lasting type of birth control for women. After this surgery, it would be rare for a woman to get pregnant. A woman's eggs are made in her ovaries. Once a month, an egg leaves the ovary and travels down a long thin tube to her uterus or womb. This tube is called the fallopian tube. If a sperm meets the egg, a woman can get pregnant.  During a tubal ligation, the fallopian tubes are cut or tied. Cutting or tying the tubes will stop the sperm from meeting the egg. After this surgery, most women cannot get pregnant.  A tubal ligation is a permanent form of birth control. You should be 100% sure before you have this surgery that you do not want to have any more children. You and your partner should have talked about your decision to not have any more children. A tubal ligation's reversal can occur but is not always successful.  This surgery does not protect you from sexually-transmitted diseases. You will still need to use a condom to protect yourself and your partner against these diseases.       · Be sure to wash your hands before and after touching your wound or dressing.  · You can expect some bleeding from your vagina for a few weeks. You may use sanitary pads but not tampons.  · Your bowel movements may take some time to get back to normal. Eat small meals high in fiber to avoid hard stools. Drink 6 to 8 glasses of water each day.  · Try to walk each day. Start by walking a little more than you did the day before. Walking boosts blood flow and helps prevent lung, belly, and blood problems.  · You will continue to have periods as you did before.  · Talk with your doctor about safe sex as you can still be exposed to sexually transmitted diseases.  ·   What follow-up care is needed?   · Your doctor may ask you to make visits to the office to check on your progress. Be sure to keep your  visits.   · You may have stitches or staples. If so, your doctor will often want to remove the stitches or staples in 1 to 2 weeks.  ·   What drugs may be needed?   The doctor may order drugs to:  · Help with pain  · Prevent infection  · Help with passing bowel movements    What problems could happen?   · Infection  · Wound opening  · Heavy blood loss  · Blood clots in your legs or lungs  · Damage to your bowel, bladder, and other organs inside the belly  · Tubes don't close all the way and you could become pregnant  · Trouble passing bowel movements  ·   When do I need to call the doctor?   · Signs of infection such as a fever of 100.4°F (38°C) or higher, chills, pain with passing urine.  · Signs of wound infection such as swelling, redness, warmth around the wound; too much pain when touched; yellowish, greenish, or bloody discharge; foul smell coming from the cut site; cut site opens up.  · Excessive blood in your sanitary pads or more than six soaked pads in a day  · Smelly green or dark yellow vaginal discharge  · Upset stomach, throwing up, or very bad belly pain  · Pain not helped by drugs you are taking  · No bowel movement after 3 days  · If you feel the need to pass urine but urine will not come out even after 6 hours  · Swelling in your leg or arm that is much greater on one side than the other    ANESTHESIA  -For the first 24 hours after surgery:  Do not drive, use heavy equipment, make important decisions, or drink alcohol  -It is normal to feel sleepy for several hours.  Rest until you are more awake.  -Have someone stay with you, if needed.  They can watch for problems and help keep you safe.  -Some possible post anesthesia side effects include: nausea and vomiting, sore throat and hoarseness, sleepiness, and dizziness.    PAIN  -If you have pain after surgery, pain medicine will help you feel better.  Take it as directed, before pain becomes severe.  Most pain relievers taken by mouth need at least  20-30 minutes to start working.  -Do not drive or drink alcohol while taking pain medicine.  -Pain medication can upset your stomach.  Taking them with a little food may help.  -Other ways to help control pain: elevation, ice, and relaxation  -Call your surgeon if still having unmanageable pain an hour after taking pain medicine.  -Pain medicine can cause constipation.  Taking an over-the counter stool softener while on prescription pain medicine and drinking plenty of fluids can prevent this side effect.  -Call your surgeon if you have severe side effects like: breathing problems, trouble waking up, dizziness, confusion, or severe constipation.    NAUSEA  -Some people have nausea after surgery.  This is often because of anesthesia, pain, pain medicine, or the stress of surgery.  -Do not push yourself to eat.  Start off with clear liquids and soup.  Slowly move to solid foods.  Don't eat fatty, rich, spicy foods at first.  Eat smaller amounts.  -If you develop persistent nausea and vomiting please notify your surgeon immediately.    BLEEDING  -Different types of surgery require different types of care and dressing changes.  It is important to follow all instructions and advice from your surgeon.  Change dressing as directed.  Call your surgeon for any concerns regarding postop bleeding.    SIGNS OF INFECTION  -Signs of infection include: fever, swelling, drainage, and redness  -Notify your surgeon if you have a fever of 100.4 F (38.0 C) or higher.  -Notify your surgeon if you notice redness, swelling, increased pain, pus, or a foul smell at the incision site.      BATHING:                   You may shower after your dressing is removed, but no tub baths, hot tubs, saunas or swimming until you see the doctor.    DRESSING:  ? Remove your bandage in 24 hours. If there are skin tapes over the incision, leave them in place. They will start to come off in 5-7 days.  ACTIVITY LEVEL: If you have received sedation or an  anesthetic, you may feel sleepy for   several hours. Rest until you are more awake. Gradually resume your normal activities  ? No heavy lifting or straining, nothing over 10 lbs., like a gallon of milk.  ? Pelvic rest- no sex, tampons or douching until follow up or instructed by doctor.  DIET:  You may resume your home diet. If nausea is present, increase your diet gradually with fluids and bland foods.    Medications:  Pain medication should be taken only if needed and as directed. If antibiotics are prescribed, the medication should be taken until completed. You will be given an updated list of you medications.  ? No driving, alcoholic beverages or signing legal documents for next 24 hours or while taking pain medication    CALL THE DOCTOR:    For any obvious bleeding (some dried blood over the incision is normal).      Redness, swelling, foul smell around incision or fever over 101.   Shortness of breath, Coughing up Bloody Sputum or Pains or Swelling in your calves.   Persistent pain or nausea not relieved by medication.   If vaginal bleeding is in excess of a normal period.   Problems urinating    If any unusual problems or difficulties occur contact your doctor. If you cannot contact your doctor but feel your signs and symptoms warrant a physicians attention return to the emergency room.    Notify  for any problems.    .

## 2022-02-09 NOTE — ANESTHESIA POSTPROCEDURE EVALUATION
Anesthesia Post Evaluation    Patient: Chanelle Almazan    Procedure(s) Performed: Procedure(s) (LRB):  SALPINGECTOMY, LAPAROSCOPIC (Bilateral)    Final Anesthesia Type: general      Patient location during evaluation: PACU  Patient participation: Yes- Able to Participate  Level of consciousness: awake and alert  Post-procedure vital signs: reviewed and stable  Pain management: adequate  Airway patency: patent    PONV status at discharge: No PONV  Anesthetic complications: no      Cardiovascular status: blood pressure returned to baseline and hemodynamically stable  Respiratory status: unassisted  Hydration status: euvolemic  Follow-up not needed.          Vitals Value Taken Time   /64 02/09/22 1241   Temp 36.7 °C (98.1 °F) 02/09/22 1235   Pulse 76 02/09/22 1243   Resp 20 02/09/22 1243   SpO2 100 % 02/09/22 1243   Vitals shown include unvalidated device data.      No case tracking events are documented in the log.      Pain/Cipriano Score: Cipriano Score: 8 (2/9/2022 12:40 PM)

## 2022-02-09 NOTE — PROGRESS NOTES
Patient has met PACU discharge criteria, VSS, pain well controlled. Family updated by phone. Released from PACU by Dr. Rodriguez

## 2022-02-10 ENCOUNTER — TELEPHONE (OUTPATIENT)
Dept: OBSTETRICS AND GYNECOLOGY | Facility: CLINIC | Age: 32
End: 2022-02-10
Payer: MEDICAID

## 2022-02-10 NOTE — TELEPHONE ENCOUNTER
----- Message from Brenda Howard MD sent at 2/9/2022  5:01 PM CST -----  Regarding: postoperative visit  Hey,     Can we get her scheduled for a postoperative visit in 4 weeks?    Brenda Howard MD, FACOG  OB/GYN

## 2022-02-15 LAB
FINAL PATHOLOGIC DIAGNOSIS: NORMAL
GROSS: NORMAL
Lab: NORMAL

## 2022-03-13 ENCOUNTER — TELEPHONE (OUTPATIENT)
Dept: OBSTETRICS AND GYNECOLOGY | Facility: CLINIC | Age: 32
End: 2022-03-13
Payer: MEDICAID

## 2022-03-13 NOTE — TELEPHONE ENCOUNTER
Attempted to contact pt to inform Dr. Howard will be out of the office on 3/15 PM so we need to reschedule her appt. No answer, lvm. Will send [facility] msg.

## 2022-12-20 PROBLEM — S20.212A CONTUSION OF RIB ON LEFT SIDE: Status: ACTIVE | Noted: 2022-12-20

## 2022-12-20 PROBLEM — M25.561 ACUTE PAIN OF RIGHT KNEE: Status: ACTIVE | Noted: 2022-12-20

## 2022-12-20 PROBLEM — S20.211A RIB CONTUSION, RIGHT, INITIAL ENCOUNTER: Status: ACTIVE | Noted: 2022-12-20

## 2023-02-14 ENCOUNTER — OFFICE VISIT (OUTPATIENT)
Dept: OBSTETRICS AND GYNECOLOGY | Facility: CLINIC | Age: 33
End: 2023-02-14
Payer: MEDICAID

## 2023-02-14 VITALS
BODY MASS INDEX: 25.85 KG/M2 | SYSTOLIC BLOOD PRESSURE: 112 MMHG | WEIGHT: 141.31 LBS | DIASTOLIC BLOOD PRESSURE: 68 MMHG

## 2023-02-14 DIAGNOSIS — Z01.419 WELL WOMAN EXAM WITH ROUTINE GYNECOLOGICAL EXAM: Primary | ICD-10-CM

## 2023-02-14 DIAGNOSIS — Z11.3 SCREENING FOR STD (SEXUALLY TRANSMITTED DISEASE): ICD-10-CM

## 2023-02-14 DIAGNOSIS — N92.6 IRREGULAR PERIODS/MENSTRUAL CYCLES: ICD-10-CM

## 2023-02-14 DIAGNOSIS — Z12.4 SCREENING FOR CERVICAL CANCER: ICD-10-CM

## 2023-02-14 PROCEDURE — 3008F PR BODY MASS INDEX (BMI) DOCUMENTED: ICD-10-PCS | Mod: CPTII,,, | Performed by: OBSTETRICS & GYNECOLOGY

## 2023-02-14 PROCEDURE — 87591 N.GONORRHOEAE DNA AMP PROB: CPT | Performed by: OBSTETRICS & GYNECOLOGY

## 2023-02-14 PROCEDURE — 99395 PREV VISIT EST AGE 18-39: CPT | Mod: S$PBB,,, | Performed by: OBSTETRICS & GYNECOLOGY

## 2023-02-14 PROCEDURE — 3008F BODY MASS INDEX DOCD: CPT | Mod: CPTII,,, | Performed by: OBSTETRICS & GYNECOLOGY

## 2023-02-14 PROCEDURE — 81514 NFCT DS BV&VAGINITIS DNA ALG: CPT | Performed by: OBSTETRICS & GYNECOLOGY

## 2023-02-14 PROCEDURE — 88175 CYTOPATH C/V AUTO FLUID REDO: CPT | Performed by: OBSTETRICS & GYNECOLOGY

## 2023-02-14 PROCEDURE — 99395 PR PREVENTIVE VISIT,EST,18-39: ICD-10-PCS | Mod: S$PBB,,, | Performed by: OBSTETRICS & GYNECOLOGY

## 2023-02-14 PROCEDURE — 1159F MED LIST DOCD IN RCRD: CPT | Mod: CPTII,,, | Performed by: OBSTETRICS & GYNECOLOGY

## 2023-02-14 PROCEDURE — 1160F RVW MEDS BY RX/DR IN RCRD: CPT | Mod: CPTII,,, | Performed by: OBSTETRICS & GYNECOLOGY

## 2023-02-14 PROCEDURE — 3074F SYST BP LT 130 MM HG: CPT | Mod: CPTII,,, | Performed by: OBSTETRICS & GYNECOLOGY

## 2023-02-14 PROCEDURE — 99999 PR PBB SHADOW E&M-EST. PATIENT-LVL III: CPT | Mod: PBBFAC,,, | Performed by: OBSTETRICS & GYNECOLOGY

## 2023-02-14 PROCEDURE — 3078F PR MOST RECENT DIASTOLIC BLOOD PRESSURE < 80 MM HG: ICD-10-PCS | Mod: CPTII,,, | Performed by: OBSTETRICS & GYNECOLOGY

## 2023-02-14 PROCEDURE — 3078F DIAST BP <80 MM HG: CPT | Mod: CPTII,,, | Performed by: OBSTETRICS & GYNECOLOGY

## 2023-02-14 PROCEDURE — 1160F PR REVIEW ALL MEDS BY PRESCRIBER/CLIN PHARMACIST DOCUMENTED: ICD-10-PCS | Mod: CPTII,,, | Performed by: OBSTETRICS & GYNECOLOGY

## 2023-02-14 PROCEDURE — 99213 OFFICE O/P EST LOW 20 MIN: CPT | Mod: PBBFAC,PO | Performed by: OBSTETRICS & GYNECOLOGY

## 2023-02-14 PROCEDURE — 1159F PR MEDICATION LIST DOCUMENTED IN MEDICAL RECORD: ICD-10-PCS | Mod: CPTII,,, | Performed by: OBSTETRICS & GYNECOLOGY

## 2023-02-14 PROCEDURE — 3074F PR MOST RECENT SYSTOLIC BLOOD PRESSURE < 130 MM HG: ICD-10-PCS | Mod: CPTII,,, | Performed by: OBSTETRICS & GYNECOLOGY

## 2023-02-14 PROCEDURE — 99999 PR PBB SHADOW E&M-EST. PATIENT-LVL III: ICD-10-PCS | Mod: PBBFAC,,, | Performed by: OBSTETRICS & GYNECOLOGY

## 2023-02-14 RX ORDER — NORETHINDRONE ACETATE AND ETHINYL ESTRADIOL, ETHINYL ESTRADIOL AND FERROUS FUMARATE 1MG-10(24)
1 KIT ORAL DAILY
Qty: 26 TABLET | Refills: 11 | Status: SHIPPED | OUTPATIENT
Start: 2023-02-14 | End: 2023-09-29

## 2023-02-14 NOTE — PROGRESS NOTES
GYNECOLOGY OFFICE NOTE    Reason for visit: annual    HPI: Pt is a 32 y.o.  female  who presents for annual. Menarche: 12. Cycle: Interval- variable Q 1-2 month, Duration- 4 days, Flow- light, denies dysmenorrhea. She is sexually active.  She uses bilateral tubal ligation for contraception.  She does desire STI screening. She denies vaginal discharge.  Last pap: 10/2020, denies hx of abnormal.     History reviewed. No pertinent past medical history.    Past Surgical History:   Procedure Laterality Date    DILATION AND CURETTAGE OF UTERUS  2018    LAPAROSCOPIC SALPINGECTOMY Bilateral 2022    Procedure: SALPINGECTOMY, LAPAROSCOPIC;  Surgeon: Brenda Howard MD;  Location: [facility] OR;  Service: OB/GYN;  Laterality: Bilateral;    TONSILLECTOMY, ADENOIDECTOMY         Family History   Problem Relation Age of Onset    Colon cancer Paternal Grandmother     Prostate cancer Maternal Grandfather     Heart attack Maternal Grandfather     Diabetes Mother     Breast cancer Neg Hx     Ovarian cancer Neg Hx        Social History     Tobacco Use    Smoking status: Never    Smokeless tobacco: Never   Substance Use Topics    Alcohol use: No    Drug use: No       OB History    Para Term  AB Living   4 3 3   1 3   SAB IAB Ectopic Multiple Live Births     1     3      # Outcome Date GA Lbr Danny/2nd Weight Sex Delivery Anes PTL Lv   4 IAB            3 Term      Vag-Spont  N BENOIT   2 Term      Vag-Spont  N BENOIT   1 Term      Vag-Spont  N BENOIT       Current Outpatient Medications   Medication Sig    norethindrone-e.estradioL-iron (LO LOESTRIN FE) 1 mg-10 mcg (24)/10 mcg (2) Tab Take 1 tablet by mouth Daily. for 365 doses     No current facility-administered medications for this visit.       Allergies: Patient has no known allergies.     /68   Wt 64.1 kg (141 lb 5 oz)   LMP 2023 (Exact Date)   BMI 25.85 kg/m²     ROS:  GENERAL: Denies fever or chills.   SKIN: Denies rash or lesions.   HEAD: Denies  head injury or headache.   CHEST: Denies chest pain or shortness of breath.   CARDIOVASCULAR: Denies palpitations or chest pain.   ABDOMEN: No constipation, diarrhea, nausea, vomiting or rectal bleeding.   URINARY: No dysuria, hematuria, or burning on urination.  REPRODUCTIVE: See HPI.   BREASTS: see HPI  NEUROLOGIC: Denies syncope or weakness.     Physical Exam:  GENERAL: alert, appears stated age and cooperative  NEUROLOGIC: orientated to person, place and time, normal mood and affect   CHEST: Normal respiratory effort  NECK: normal appearance  SKIN: no acne, hirsutism  BREAST EXAM: breasts appear normal, no suspicious masses, no skin or nipple changes or axillary nodes  ABDOMEN: abdomen is soft without significant tenderness, masses  EXTERNAL GENITALIA:  normal general appearance  URETHRA: normal urethra, normal urethral meatus  VAGINA:  normal mucosa, no  lesions  CERVIX:  Normal  UTERUS:  mobile, non tender  ADNEXA: nontender    Diagnosis:  1. Well woman exam with routine gynecological exam    2. Screening for STD (sexually transmitted disease)    3. Screening for cervical cancer    4. Irregular periods/menstrual cycles        Plan:   1. Annual  2. F/u gc/ct and affirm  3. Pap  4. Will try low dose ocp to see if it can help with cycle regulation    Orders Placed This Encounter    C. trachomatis/N. gonorrhoeae by AMP DNA [facility]; Cervix    Vaginosis Screen by DNA Probe    Liquid-Based Pap Smear, Screening    norethindrone-e.estradioL-iron (LO LOESTRIN FE) 1 mg-10 mcg (24)/10 mcg (2) Tab       Brenda Howard MD  OB/GYN

## 2023-02-15 LAB
C TRACH DNA SPEC QL NAA+PROBE: NOT DETECTED
N GONORRHOEA DNA SPEC QL NAA+PROBE: NOT DETECTED

## 2023-02-16 RX ORDER — METRONIDAZOLE 500 MG/1
500 TABLET ORAL EVERY 12 HOURS
Qty: 14 TABLET | Refills: 0 | Status: SHIPPED | OUTPATIENT
Start: 2023-02-16 | End: 2023-02-23

## 2023-09-27 DIAGNOSIS — T14.8XXA FX: Primary | ICD-10-CM

## 2023-09-28 ENCOUNTER — TELEPHONE (OUTPATIENT)
Dept: ORTHOPEDICS | Facility: CLINIC | Age: 33
End: 2023-09-28
Payer: COMMERCIAL

## 2023-09-29 ENCOUNTER — TELEPHONE (OUTPATIENT)
Dept: ORTHOPEDICS | Facility: CLINIC | Age: 33
End: 2023-09-29

## 2023-09-29 ENCOUNTER — OFFICE VISIT (OUTPATIENT)
Dept: ORTHOPEDICS | Facility: CLINIC | Age: 33
End: 2023-09-29
Payer: COMMERCIAL

## 2023-09-29 ENCOUNTER — ANESTHESIA EVENT (OUTPATIENT)
Dept: SURGERY | Facility: HOSPITAL | Age: 33
End: 2023-09-29
Payer: COMMERCIAL

## 2023-09-29 ENCOUNTER — HOSPITAL ENCOUNTER (OUTPATIENT)
Dept: RADIOLOGY | Facility: OTHER | Age: 33
Discharge: HOME | End: 2023-09-29
Attending: PHYSICIAN ASSISTANT
Payer: COMMERCIAL

## 2023-09-29 VITALS — BODY MASS INDEX: 25.76 KG/M2 | HEIGHT: 62 IN | WEIGHT: 140 LBS

## 2023-09-29 DIAGNOSIS — T14.8XXA FX: ICD-10-CM

## 2023-09-29 DIAGNOSIS — S62.346A CLOSED NONDISPLACED FRACTURE OF BASE OF FIFTH METACARPAL BONE OF RIGHT HAND, INITIAL ENCOUNTER: ICD-10-CM

## 2023-09-29 PROCEDURE — 3008F PR BODY MASS INDEX (BMI) DOCUMENTED: ICD-10-PCS | Mod: CPTII,S$GLB,, | Performed by: PHYSICIAN ASSISTANT

## 2023-09-29 PROCEDURE — 99204 PR OFFICE/OUTPT VISIT, NEW, LEVL IV, 45-59 MIN: ICD-10-PCS | Mod: 25,S$GLB,, | Performed by: PHYSICIAN ASSISTANT

## 2023-09-29 PROCEDURE — 99999 PR PBB SHADOW E&M-EST. PATIENT-LVL IV: CPT | Mod: PBBFAC,,, | Performed by: PHYSICIAN ASSISTANT

## 2023-09-29 PROCEDURE — 29125 APPL SHORT ARM SPLINT STATIC: CPT | Mod: RT,S$GLB,, | Performed by: PHYSICIAN ASSISTANT

## 2023-09-29 PROCEDURE — 99999 PR PBB SHADOW E&M-EST. PATIENT-LVL IV: ICD-10-PCS | Mod: PBBFAC,,, | Performed by: PHYSICIAN ASSISTANT

## 2023-09-29 PROCEDURE — 73130 X-RAY EXAM OF HAND: CPT | Mod: TC,FY,RT

## 2023-09-29 PROCEDURE — 1159F PR MEDICATION LIST DOCUMENTED IN MEDICAL RECORD: ICD-10-PCS | Mod: CPTII,S$GLB,, | Performed by: PHYSICIAN ASSISTANT

## 2023-09-29 PROCEDURE — 73130 X-RAY EXAM OF HAND: CPT | Mod: 26,RT,, | Performed by: RADIOLOGY

## 2023-09-29 PROCEDURE — 99204 OFFICE O/P NEW MOD 45 MIN: CPT | Mod: 25,S$GLB,, | Performed by: PHYSICIAN ASSISTANT

## 2023-09-29 PROCEDURE — 73130 XR HAND COMPLETE 3 VIEW RIGHT: ICD-10-PCS | Mod: 26,RT,, | Performed by: RADIOLOGY

## 2023-09-29 PROCEDURE — 3008F BODY MASS INDEX DOCD: CPT | Mod: CPTII,S$GLB,, | Performed by: PHYSICIAN ASSISTANT

## 2023-09-29 PROCEDURE — 1159F MED LIST DOCD IN RCRD: CPT | Mod: CPTII,S$GLB,, | Performed by: PHYSICIAN ASSISTANT

## 2023-09-29 PROCEDURE — 29125 PR APPLY FOREARM SPLINT,STATIC: ICD-10-PCS | Mod: RT,S$GLB,, | Performed by: PHYSICIAN ASSISTANT

## 2023-09-29 RX ORDER — OXYCODONE AND ACETAMINOPHEN 5; 325 MG/1; MG/1
1 TABLET ORAL
Qty: 10 TABLET | Refills: 0 | Status: SHIPPED | OUTPATIENT
Start: 2023-09-29 | End: 2023-10-04 | Stop reason: SDUPTHER

## 2023-09-29 RX ORDER — ACETAMINOPHEN 500 MG
1000 TABLET ORAL 2 TIMES DAILY PRN
Qty: 50 TABLET | Refills: 0 | Status: SHIPPED | OUTPATIENT
Start: 2023-09-29

## 2023-09-29 RX ORDER — HYDROCODONE BITARTRATE AND ACETAMINOPHEN 5; 325 MG/1; MG/1
1 TABLET ORAL EVERY 6 HOURS PRN
Qty: 10 TABLET | Refills: 0 | Status: SHIPPED | OUTPATIENT
Start: 2023-09-29

## 2023-09-29 RX ORDER — IBUPROFEN 600 MG/1
600 TABLET ORAL 3 TIMES DAILY PRN
Qty: 45 TABLET | Refills: 0 | Status: SHIPPED | OUTPATIENT
Start: 2023-09-29

## 2023-09-29 NOTE — TELEPHONE ENCOUNTER
Spoke c pt. Informed pt of 7:15 a.m. arrival time for 10/02/23 surgery at the [facility]. Reminded pt of NPO status & PO appt. Pt expressed understanding & was thankful.
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
## 2023-10-01 PROBLEM — S62.316A CLOSED DISPLACED FRACTURE OF BASE OF FIFTH METACARPAL BONE OF RIGHT HAND: Status: ACTIVE | Noted: 2023-10-01

## 2023-10-02 ENCOUNTER — HOSPITAL ENCOUNTER (OUTPATIENT)
Facility: HOSPITAL | Age: 33
Discharge: HOME | End: 2023-10-02
Attending: ORTHOPAEDIC SURGERY | Admitting: ORTHOPAEDIC SURGERY
Payer: COMMERCIAL

## 2023-10-02 ENCOUNTER — ANESTHESIA (OUTPATIENT)
Dept: SURGERY | Facility: HOSPITAL | Age: 33
End: 2023-10-02
Payer: COMMERCIAL

## 2023-10-02 VITALS
HEART RATE: 72 BPM | HEIGHT: 62 IN | TEMPERATURE: 98 F | SYSTOLIC BLOOD PRESSURE: 113 MMHG | DIASTOLIC BLOOD PRESSURE: 73 MMHG | RESPIRATION RATE: 20 BRPM | OXYGEN SATURATION: 97 % | BODY MASS INDEX: 25.58 KG/M2 | WEIGHT: 139 LBS

## 2023-10-02 DIAGNOSIS — S62.316A CLOSED DISPLACED FRACTURE OF BASE OF FIFTH METACARPAL BONE OF RIGHT HAND: ICD-10-CM

## 2023-10-02 PROCEDURE — D9220A PRA ANESTHESIA: ICD-10-PCS | Mod: ANES,,, | Performed by: ANESTHESIOLOGY

## 2023-10-02 PROCEDURE — C1713 ANCHOR/SCREW BN/BN,TIS/BN: HCPCS | Performed by: ORTHOPAEDIC SURGERY

## 2023-10-02 PROCEDURE — D9220A PRA ANESTHESIA: Mod: ANES,,, | Performed by: ANESTHESIOLOGY

## 2023-10-02 PROCEDURE — D9220A PRA ANESTHESIA: Mod: CRNA,,, | Performed by: NURSE ANESTHETIST, CERTIFIED REGISTERED

## 2023-10-02 PROCEDURE — 25000003 PHARM REV CODE 250: Performed by: ORTHOPAEDIC SURGERY

## 2023-10-02 PROCEDURE — D9220A PRA ANESTHESIA: ICD-10-PCS | Mod: CRNA,,, | Performed by: NURSE ANESTHETIST, CERTIFIED REGISTERED

## 2023-10-02 PROCEDURE — 36000705 HC OR TIME LEV I EA ADD 15 MIN: Performed by: ORTHOPAEDIC SURGERY

## 2023-10-02 PROCEDURE — 63600175 PHARM REV CODE 636 W HCPCS

## 2023-10-02 PROCEDURE — 94761 N-INVAS EAR/PLS OXIMETRY MLT: CPT

## 2023-10-02 PROCEDURE — 26608 PR CLOSED RX METACARPAL FX,PERCUT: ICD-10-PCS | Mod: RT,,, | Performed by: ORTHOPAEDIC SURGERY

## 2023-10-02 PROCEDURE — 99900035 HC TECH TIME PER 15 MIN (STAT)

## 2023-10-02 PROCEDURE — 37000008 HC ANESTHESIA 1ST 15 MINUTES: Performed by: ORTHOPAEDIC SURGERY

## 2023-10-02 PROCEDURE — 63600175 PHARM REV CODE 636 W HCPCS: Performed by: ANESTHESIOLOGY

## 2023-10-02 PROCEDURE — 26608 TREAT METACARPAL FRACTURE: CPT | Mod: RT,,, | Performed by: ORTHOPAEDIC SURGERY

## 2023-10-02 PROCEDURE — 71000033 HC RECOVERY, INTIAL HOUR: Performed by: ORTHOPAEDIC SURGERY

## 2023-10-02 PROCEDURE — 64415 NJX AA&/STRD BRCH PLXS IMG: CPT | Performed by: ANESTHESIOLOGY

## 2023-10-02 PROCEDURE — 37000009 HC ANESTHESIA EA ADD 15 MINS: Performed by: ORTHOPAEDIC SURGERY

## 2023-10-02 PROCEDURE — 71000015 HC POSTOP RECOV 1ST HR: Performed by: ORTHOPAEDIC SURGERY

## 2023-10-02 PROCEDURE — 36000704 HC OR TIME LEV I 1ST 15 MIN: Performed by: ORTHOPAEDIC SURGERY

## 2023-10-02 PROCEDURE — 63600175 PHARM REV CODE 636 W HCPCS: Performed by: SURGERY

## 2023-10-02 PROCEDURE — 63600175 PHARM REV CODE 636 W HCPCS: Performed by: NURSE ANESTHETIST, CERTIFIED REGISTERED

## 2023-10-02 PROCEDURE — 25000003 PHARM REV CODE 250

## 2023-10-02 PROCEDURE — 25000003 PHARM REV CODE 250: Performed by: SURGERY

## 2023-10-02 PROCEDURE — 64415 SUPRACLAVICULAR SINGLE SHOT: ICD-10-PCS | Mod: 59,RT,, | Performed by: ANESTHESIOLOGY

## 2023-10-02 PROCEDURE — 25000003 PHARM REV CODE 250: Performed by: NURSE ANESTHETIST, CERTIFIED REGISTERED

## 2023-10-02 DEVICE — K-WIRE SNGL TRCR .045 D 6L N/S: Type: IMPLANTABLE DEVICE | Site: HAND | Status: FUNCTIONAL

## 2023-10-02 RX ORDER — ONDANSETRON 2 MG/ML
INJECTION INTRAMUSCULAR; INTRAVENOUS
Status: DISCONTINUED | OUTPATIENT
Start: 2023-10-02 | End: 2023-10-02

## 2023-10-02 RX ORDER — PROPOFOL 10 MG/ML
VIAL (ML) INTRAVENOUS CONTINUOUS PRN
Status: DISCONTINUED | OUTPATIENT
Start: 2023-10-02 | End: 2023-10-02

## 2023-10-02 RX ORDER — FAMOTIDINE 10 MG/ML
INJECTION INTRAVENOUS
Status: DISCONTINUED | OUTPATIENT
Start: 2023-10-02 | End: 2023-10-02

## 2023-10-02 RX ORDER — ACETAMINOPHEN 500 MG
1000 TABLET ORAL
Status: COMPLETED | OUTPATIENT
Start: 2023-10-02 | End: 2023-10-02

## 2023-10-02 RX ORDER — FENTANYL CITRATE 50 UG/ML
25-200 INJECTION, SOLUTION INTRAMUSCULAR; INTRAVENOUS
Status: DISCONTINUED | OUTPATIENT
Start: 2023-10-02 | End: 2023-10-02 | Stop reason: HOSPADM

## 2023-10-02 RX ORDER — SODIUM CHLORIDE 9 MG/ML
INJECTION, SOLUTION INTRAVENOUS CONTINUOUS
Status: DISCONTINUED | OUTPATIENT
Start: 2023-10-02 | End: 2023-10-02 | Stop reason: HOSPADM

## 2023-10-02 RX ORDER — LIDOCAINE HYDROCHLORIDE 20 MG/ML
INJECTION INTRAVENOUS
Status: DISCONTINUED | OUTPATIENT
Start: 2023-10-02 | End: 2023-10-02

## 2023-10-02 RX ORDER — OXYCODONE AND ACETAMINOPHEN 5; 325 MG/1; MG/1
1 TABLET ORAL
Status: DISCONTINUED | OUTPATIENT
Start: 2023-10-02 | End: 2023-10-02 | Stop reason: HOSPADM

## 2023-10-02 RX ORDER — DEXAMETHASONE SODIUM PHOSPHATE 4 MG/ML
INJECTION, SOLUTION INTRA-ARTICULAR; INTRALESIONAL; INTRAMUSCULAR; INTRAVENOUS; SOFT TISSUE
Status: DISCONTINUED | OUTPATIENT
Start: 2023-10-02 | End: 2023-10-02

## 2023-10-02 RX ORDER — ROPIVACAINE HYDROCHLORIDE 5 MG/ML
INJECTION, SOLUTION EPIDURAL; INFILTRATION; PERINEURAL
Status: COMPLETED | OUTPATIENT
Start: 2023-10-02 | End: 2023-10-02

## 2023-10-02 RX ORDER — SODIUM CHLORIDE 0.9 % (FLUSH) 0.9 %
5 SYRINGE (ML) INJECTION
Status: DISCONTINUED | OUTPATIENT
Start: 2023-10-02 | End: 2023-10-02 | Stop reason: HOSPADM

## 2023-10-02 RX ORDER — LIDOCAINE HYDROCHLORIDE 10 MG/ML
1 INJECTION, SOLUTION EPIDURAL; INFILTRATION; INTRACAUDAL; PERINEURAL ONCE
Status: DISCONTINUED | OUTPATIENT
Start: 2023-10-02 | End: 2023-10-02 | Stop reason: HOSPADM

## 2023-10-02 RX ORDER — MIDAZOLAM HYDROCHLORIDE 1 MG/ML
4 INJECTION INTRAMUSCULAR; INTRAVENOUS
Status: DISCONTINUED | OUTPATIENT
Start: 2023-10-02 | End: 2023-10-02 | Stop reason: HOSPADM

## 2023-10-02 RX ORDER — MUPIROCIN 20 MG/G
OINTMENT TOPICAL
Status: DISCONTINUED | OUTPATIENT
Start: 2023-10-02 | End: 2023-10-02 | Stop reason: HOSPADM

## 2023-10-02 RX ORDER — HALOPERIDOL 5 MG/ML
0.5 INJECTION INTRAMUSCULAR EVERY 10 MIN PRN
Status: DISCONTINUED | OUTPATIENT
Start: 2023-10-02 | End: 2023-10-02 | Stop reason: HOSPADM

## 2023-10-02 RX ORDER — BACITRACIN ZINC 500 UNIT/G
OINTMENT (GRAM) TOPICAL
Status: DISCONTINUED | OUTPATIENT
Start: 2023-10-02 | End: 2023-10-02 | Stop reason: HOSPADM

## 2023-10-02 RX ORDER — HYDROMORPHONE HYDROCHLORIDE 1 MG/ML
0.2 INJECTION, SOLUTION INTRAMUSCULAR; INTRAVENOUS; SUBCUTANEOUS EVERY 10 MIN PRN
Status: DISCONTINUED | OUTPATIENT
Start: 2023-10-02 | End: 2023-10-02 | Stop reason: HOSPADM

## 2023-10-02 RX ADMIN — FAMOTIDINE 20 MG: 10 INJECTION, SOLUTION INTRAVENOUS at 09:10

## 2023-10-02 RX ADMIN — FENTANYL CITRATE 100 MCG: 50 INJECTION INTRAMUSCULAR; INTRAVENOUS at 08:10

## 2023-10-02 RX ADMIN — PROPOFOL 125 MCG/KG/MIN: 10 INJECTION, EMULSION INTRAVENOUS at 08:10

## 2023-10-02 RX ADMIN — ROPIVACAINE HYDROCHLORIDE 30 ML: 5 INJECTION EPIDURAL; INFILTRATION; PERINEURAL at 08:10

## 2023-10-02 RX ADMIN — ONDANSETRON 4 MG: 2 INJECTION INTRAMUSCULAR; INTRAVENOUS at 09:10

## 2023-10-02 RX ADMIN — MUPIROCIN: 20 OINTMENT TOPICAL at 07:10

## 2023-10-02 RX ADMIN — CEFAZOLIN 2 G: 2 INJECTION, POWDER, FOR SOLUTION INTRAMUSCULAR; INTRAVENOUS at 09:10

## 2023-10-02 RX ADMIN — MIDAZOLAM HYDROCHLORIDE 2 MG: 1 INJECTION, SOLUTION INTRAMUSCULAR; INTRAVENOUS at 08:10

## 2023-10-02 RX ADMIN — DEXAMETHASONE SODIUM PHOSPHATE 8 MG: 4 INJECTION, SOLUTION INTRAMUSCULAR; INTRAVENOUS at 09:10

## 2023-10-02 RX ADMIN — SODIUM CHLORIDE: 0.9 INJECTION, SOLUTION INTRAVENOUS at 07:10

## 2023-10-02 RX ADMIN — LIDOCAINE HYDROCHLORIDE 75 MG: 20 INJECTION INTRAVENOUS at 08:10

## 2023-10-02 RX ADMIN — ACETAMINOPHEN 1000 MG: 500 TABLET ORAL at 07:10

## 2023-10-02 NOTE — INTERVAL H&P NOTE
The patient has been examined and the H&P has been reviewed:    I concur with the findings and no changes have occurred since H&P was written.    Surgery risks, benefits and alternative options discussed and understood by patient/family.          Active Hospital Problems    Diagnosis  POA    *Closed displaced fracture of base of fifth metacarpal bone of right hand [S62.316A]  Yes      Resolved Hospital Problems   No resolved problems to display.

## 2023-10-02 NOTE — BRIEF OP NOTE
[facility] - [facility] (Hospital)  Brief Operative Note    Surgery Date: 10/2/2023     Surgeon(s) and Role:     * Shagufta Escobar MD - Primary    Assisting Surgeon: None    Pre-op Diagnosis:  Closed nondisplaced fracture of base of fifth metacarpal bone of right hand, initial encounter [S62.346A]    Post-op Diagnosis:  Post-Op Diagnosis Codes:     * Closed nondisplaced fracture of base of fifth metacarpal bone of right hand, initial encounter [S62.346A]    Procedure(s) (LRB):  CLOSED REDUCTION, FRACTURE, METACARPAL BONE, right 5th metacarpal base (Right)    Anesthesia: Regional    Operative Findings: see op note    Estimated Blood Loss: * No values recorded between 10/2/2023  9:19 AM and 10/2/2023  9:45 AM *         Specimens:   Specimen (24h ago, onward)      None              Discharge Note    OUTCOME: Patient tolerated treatment/procedure well without complication and is now ready for discharge.    DISPOSITION: Home or Self Care    FINAL DIAGNOSIS:  Closed displaced fracture of base of fifth metacarpal bone of right hand    FOLLOWUP: In clinic    DISCHARGE INSTRUCTIONS:    Discharge Procedure Orders   Diet general     Sponge bath only until clinic visit     Leave dressing on - Keep it clean, dry, and intact until clinic visit     Call MD for:  temperature >100.4     Call MD for:  persistent nausea and vomiting     Call MD for:  severe uncontrolled pain     Call MD for:  difficulty breathing, headache or visual disturbances     Call MD for:  redness, tenderness, or signs of infection (pain, swelling, redness, odor or green/yellow discharge around incision site)     Call MD for:  hives     Call MD for:  persistent dizziness or light-headedness     Call MD for:  extreme fatigue

## 2023-10-02 NOTE — OP NOTE
Orthopedic Operative Note       Surgery Date: 10/2/2023     Surgeon(s) and Role:     * Shagufta Escobar MD - Primary    Pre-op Diagnosis:  Closed nondisplaced fracture of base of fifth metacarpal bone of right hand, initial encounter [S62.346A]    Post-op Diagnosis:  Same    Procedure(s) (LRB):  CLOSED REDUCTION, FRACTURE, METACARPAL BONE, right 5th metacarpal base (Right)    Anesthesia: Regional    Estimated Blood Loss: Minimal           Specimen: None    Complications: None               Condition: Stable    IMPLANTS:    Implant Name Type Inv. Item Serial No.  Lot No. LRB No. Used Action   K-WIRE SNGL TRCR .045 D 6L N/S - [id number]  K-WIRE SNGL TRCR .045 D 6L N/S  [employer] [id number] Right 2 Implanted   K-WIRE SNGL TRCR .045 D 6L N/S - [id number]  K-WIRE SNGL TRCR .045 D 6L N/S  [employer] [id number] Right 1 Implanted   K-WIRE SNGL TRCR .045 D 6L N/S - [id number]  K-WIRE SNGL TRCR .045 D 6L N/S  [employer] [id number] Right 1 Wasted       INDICATIONS FOR PROCEDURE: Chanelle Almazan is a 33 y.o. female who sustained above injury.  Due to unstable nature of fracture, it is indicated to stabilize the fracture with operative fixation.  Risks and benefits of the procedure were performed.  Consents were obtained.    PROCEDURE IN DETAIL:   The patient was identified in the preoperative holding area and the site was marked.  Regional anesthesia was performed and the patient was wheeled into the Operating Room. The patient was sedated and the block was tested distally by pinching with forceps. Preoperative antibiotics were administered. A timeout was undertaken to confirm patient, site, surgery, surgeon and administration of preoperative antibiotics.  All agreed and we proceeded.     Borders of the 5th metacarpal and fracture site were marked under fluoroscopy. A .045 K-wire was advanced in retrograde fashion from ulnar border of the metacarpal shaft, past the fracture site, through the metacarpal base and then into  the carpus. A second .045 K-wire was inserted from the ulnar border of the 5th metacarpal base and was advanced to the far cortex of the 4th metacarpal base. In a similar fashion a third .045 K-wire was placed from ulnar border of the 5th metacarpal shaft and advanced until the far cortex of the 4th metacarpal shaft. K-wire trajectory and fracture stability was assessed under fluoro. Once we were satisfied that the fracture was stable and the K-wires were appropriate, final x-rays were taken. K-wires were bent at the skin and then clipped. The percutaneous pin sites were dressed with Bacitracin and Adeptec, sterile gauze and cast padding. A well-molded ulnar gutter splint was applied.    All instrument and sponge counts were reported as correct at the end of the case. There were no complications. The patient was taken to the Recovery Room in stable condition.    PLAN FOR THE PATIENT: The patient will return to clinic in 2 weeks for wound check and repeat x-rays.

## 2023-10-02 NOTE — ANESTHESIA PREPROCEDURE EVALUATION
[facility]  Anesthesia Pre-Operative Evaluation       Patient Name: Chanelle Almazan  YOB: 1990  MRN: [id number]  CSN: [id number]      Code Status: Full Code   Date of Procedure: 10/2/2023  Anesthesia: Regional Procedure: Procedure(s) (LRB):  CLOSED REDUCTION, FRACTURE, METACARPAL BONE, right 5th metacarpal base (Right)  Pre-Operative Diagnosis: Closed nondisplaced fracture of base of fifth metacarpal bone of right hand, initial encounter [S62.346A]  Proceduralist: Surgeon(s) and Role:     * Shagufta Escobar MD - Primary        SUBJECTIVE:   Chanelle Almazan is a 33 y.o. female who  has no past medical history on file. No notes on file    Anticoagulants   Medication Route Frequency       she is not on any long-term medications.   ALLERGIES:   Review of patient's allergies indicates:  No Known Allergies  LDA:          Lines/Drains/Airways     None               MEDICATIONS:     Antibiotics (From admission, onward)    Start     Stop Route Frequency Ordered    10/02/23 0732  mupirocin 2 % ointment         -- Nasl On Call Procedure 10/02/23 0732    10/02/23 0732  ceFAZolin 2 g in dextrose 5 % in water (D5W) 50 mL IVPB (MB+)         -- IV On Call Procedure 10/02/23 0732        VTE Risk Mitigation (From admission, onward)         Ordered     IP VTE LOW RISK PATIENT  Once         10/02/23 0732     Place SHREYA hose  Until discontinued         10/02/23 0732     Place sequential compression device  Until discontinued         10/02/23 0732              Current Facility-Administered Medications   Medication Dose Route Frequency Provider Last Rate Last Admin    0.9%  NaCl infusion   Intravenous Continuous Adam Das MD        acetaminophen tablet 1,000 mg  1,000 mg Oral Once Pre-Op Adam Das MD        ceFAZolin 2 g in dextrose 5 % in water (D5W) 50 mL IVPB (MB+)  2 g Intravenous On Call Procedure Cristóbal Pierre MD        fentaNYL 50 mcg/mL injection  mcg    mcg Intravenous PRN Adam Das MD        LIDOcaine (PF) 10 mg/ml (1%) injection 10 mg  1 mL Intradermal Once Adam Das MD        midazolam (VERSED) 1 mg/mL injection 4 mg  4 mg Intravenous PRN Adam Das MD        mupirocin 2 % ointment   Nasal On Call Procedure Cristóbal Pierre MD              History:     Active Hospital Problems    Diagnosis  POA    *Closed displaced fracture of base of fifth metacarpal bone of right hand [S62.316A]  Yes      Resolved Hospital Problems   No resolved problems to display.     Surgical History:    has a past surgical history that includes TONSILLECTOMY, ADENOIDECTOMY; Dilation and curettage of uterus (09/2018); and Laparoscopic salpingectomy (Bilateral, 2/9/2022).   Social History:    reports being sexually active and has had partner(s) who are male. She reports using the following methods of birth control/protection: Condom and OCP.  reports that she has never smoked. She has never used smokeless tobacco. She reports that she does not drink alcohol and does not use drugs.     OBJECTIVE:     Vital Signs (Most Recent):    Vital Signs Range (Last 24H):          There is no height or weight on file to calculate BMI.   Wt Readings from Last 4 Encounters:   09/29/23 63.5 kg (139 lb 15.9 oz)   09/24/23 63.5 kg (140 lb)   06/22/23 63.1 kg (139 lb 1.8 oz)   02/14/23 64.1 kg (141 lb 5 oz)     Significant Labs:  Lab Results   Component Value Date    WBC 6.06 02/04/2022    HGB 13.2 02/04/2022    HCT 39.4 02/04/2022     02/04/2022     08/22/2018    K 3.6 08/22/2018     08/22/2018    CREATININE 0.6 08/22/2018    BUN 6 08/22/2018    CO2 23 08/22/2018     08/22/2018    CALCIUM 9.4 08/22/2018    ALKPHOS 62 08/22/2018    ALT 9 (L) 08/22/2018    AST 17 08/22/2018    ALBUMIN 4.1 08/22/2018    PREGTESTUR Negative 04/04/2020    HCGQUANT <1.2 12/21/2018     Patient's last menstrual period was 09/15/2023.  No results found for this or any  "previous visit (from the past 72 hour(s)).    EKG:   No results found for this or any previous visit.    TTE:  No results found for this or any previous visit.  No results found for: "EF"   No results found for this or any previous visit.  MARCELA:  No results found for this or any previous visit.  Stress Test:  No results found for this or any previous visit.     LHC:  No results found for this or any previous visit.     PFT:  No results found for: "FEV1", "FVC", "MCY2DTY", "TLC", "DLCO"   ASSESSMENT/PLAN:         Pre-op Assessment    I have reviewed the Patient Summary Reports.     I have reviewed the Nursing Notes.    I have reviewed the Medications.     Review of Systems  Anesthesia Hx:  No problems with previous Anesthesia    Hematology/Oncology:  Hematology Normal   Oncology Normal     EENT/Dental:EENT/Dental Normal   Cardiovascular:  Cardiovascular Normal Exercise tolerance: good     Pulmonary:  Pulmonary Normal    Renal/:  Renal/ Normal     Hepatic/GI:  Hepatic/GI Normal    Musculoskeletal:  Musculoskeletal Normal    Neurological:  Neurology Normal    Endocrine:  Endocrine Normal    Dermatological:  Skin Normal    Psych:  Psychiatric Normal           Physical Exam  General: Well nourished    Airway:  Mallampati: III / II  Mouth Opening: Normal  TM Distance: Normal  Tongue: Normal  Neck ROM: Normal ROM    Dental:  Intact        Anesthesia Plan  Type of Anesthesia, risks & benefits discussed:    Anesthesia Type: Gen ETT, Regional, Gen Natural Airway, MAC  Intra-op Monitoring Plan: Standard ASA Monitors  Post Op Pain Control Plan: multimodal analgesia and IV/PO Opioids PRN  Induction:  IV  Airway Plan: Direct and Video, Post-Induction  Informed Consent: Informed consent signed with the Patient and all parties understand the risks and agree with anesthesia plan.  All questions answered.   ASA Score: 2  Day of Surgery Review of History & Physical: H&P completed by Anesthesiologist.  Anesthesia Plan Notes: Chart " reviewed, patient interviewed and examined.  The anesthetic plan was explained.  Risks, benefits, and alternatives were discussed. Questions were answered and the consent was signed.        MONIKA [facility] From Anesthesia Perspective.     .

## 2023-10-02 NOTE — DISCHARGE INSTRUCTIONS
AFTER HAND SURGERY    DOS:  Keep affected wrist elevated above the level of your heart  Check circulation frequently in fingers by pressing on the nail bed. Nail bed should turn white and then pink when released.  Exercise fingers to promote circulation.  Advance diet as tolerated  Resume home medications today.  Keep arm in sling until block wears off        DONT:  No driving for 24 hours or while taking narcotic pain medication  Don't lift anything heavier than a milk carton till follow up appointment      CALL PHYSICIAN FOR:  Obvious bleeding  Excessive swelling  Drainage (pus) from the wound  Pain unrelieved by pain medication.

## 2023-10-02 NOTE — PLAN OF CARE
Discharge instructions given and explained to patient and spouse with verbalization of understanding all instructions. Dr. Wray and Dr. Morrow spoke with patient prior to D/C. Patient is AAOx3,v/s stable, denies n/v and tolerating po, rates pain level tolerable, IV removed, and family at bedside. Patient discharged home with arm sling. Medications delivered to bedside. Patient transported off unit via wheelchair to private vehicle with family.

## 2023-10-02 NOTE — TRANSFER OF CARE
"Anesthesia Transfer of Care Note    Patient: Chanelle Almazan    Procedure(s) Performed: Procedure(s) (LRB):  CLOSED REDUCTION, FRACTURE, METACARPAL BONE, right 5th metacarpal base (Right)    Patient location: PACU    Anesthesia Type: general    Transport from OR: Transported from OR on room air with adequate spontaneous ventilation    Post pain: adequate analgesia    Post assessment: no apparent anesthetic complications and tolerated procedure well    Post vital signs: stable    Level of consciousness: awake, alert and oriented    Nausea/Vomiting: no nausea/vomiting    Complications: none    Transfer of care protocol was followed      Last vitals:   Visit Vitals  BP (!) 107/58 (BP Location: Left arm, Patient Position: Lying)   Pulse 77   Temp 37.1 °C (98.8 °F) (Oral)   Resp (!) 25   Ht 5' 2" (1.575 m)   Wt 63 kg (139 lb)   LMP 09/15/2023   SpO2 100%   Breastfeeding No   BMI 25.42 kg/m²     "

## 2023-10-02 NOTE — ANESTHESIA PROCEDURE NOTES
Supraclavicular single shot    Patient location during procedure: pre-op   Block not for primary anesthetic.  Reason for block: at surgeon's request and post-op pain management   Post-op Pain Location: right hand pain   Start time: 10/2/2023 8:26 AM  Timeout: 10/2/2023 8:24 AM   End time: 10/2/2023 8:30 AM    Staffing  Authorizing Provider: La Morrow MD  Performing Provider: La Morrow MD    Staffing  Performed by: La Morrow MD  Authorized by: La Morrow MD    Preanesthetic Checklist  Completed: patient identified, IV checked, site marked, risks and benefits discussed, surgical consent, monitors and equipment checked, pre-op evaluation and timeout performed  Peripheral Block  Patient position: supine  Prep: ChloraPrep  Patient monitoring: heart rate, cardiac monitor, continuous pulse ox, continuous capnometry and frequent blood pressure checks  Block type: supraclavicular  Laterality: right  Injection technique: single shot  Needle  Needle type: Stimuplex   Needle gauge: 22 G  Needle length: 2 in  Needle localization: anatomical landmarks and ultrasound guidance   -ultrasound image captured on disc.  Assessment  Injection assessment: negative aspiration, negative parasthesia and local visualized surrounding nerve  Paresthesia pain: none  Heart rate change: no  Slow fractionated injection: yes    Medications:    Medications: ropivacaine (NAROPIN) injection 0.5% - Perineural   30 mL - 10/2/2023 8:26:00 AM    Additional Notes  VSS.  [facility] RN monitoring vitals throughout procedure.  Patient tolerated procedure well.

## 2023-10-04 ENCOUNTER — PATIENT MESSAGE (OUTPATIENT)
Dept: ORTHOPEDICS | Facility: CLINIC | Age: 33
End: 2023-10-04
Payer: COMMERCIAL

## 2023-10-04 ENCOUNTER — TELEPHONE (OUTPATIENT)
Dept: ORTHOPEDICS | Facility: CLINIC | Age: 33
End: 2023-10-04
Payer: COMMERCIAL

## 2023-10-04 DIAGNOSIS — S62.346A CLOSED NONDISPLACED FRACTURE OF BASE OF FIFTH METACARPAL BONE OF RIGHT HAND, INITIAL ENCOUNTER: ICD-10-CM

## 2023-10-04 RX ORDER — OXYCODONE AND ACETAMINOPHEN 5; 325 MG/1; MG/1
1 TABLET ORAL
Qty: 15 TABLET | Refills: 0 | Status: SHIPPED | OUTPATIENT
Start: 2023-10-04

## 2023-10-04 NOTE — TELEPHONE ENCOUNTER
Thank you for the message. Can you please answer the following questions:     How often you have been taking your pain medication? q4H, at hour 3 pt is in excruciating pain    How would you rate your pain (0-10) before you take a dose of pain medication? 10/10    How would you rate your pain (0-10) at its best after taking a dose of pain medication? 7/10    How many tablets of pain medication do you have remaining?  3    Are you taking an anti-inflammatory medication (ibuprofen/advil/motrin)?  Ibuprofen 3x's day  Are you taking additional extra strength Tylenol?   Yes, pt has taken on today.     Are you keeping your arm elevated? yes    Have you been icing? yes

## 2023-10-12 DIAGNOSIS — R52 PAIN: Primary | ICD-10-CM

## 2023-10-13 ENCOUNTER — TELEPHONE (OUTPATIENT)
Dept: ORTHOPEDICS | Facility: CLINIC | Age: 33
End: 2023-10-13
Payer: COMMERCIAL

## 2023-10-17 ENCOUNTER — OFFICE VISIT (OUTPATIENT)
Dept: ORTHOPEDICS | Facility: CLINIC | Age: 33
End: 2023-10-17
Payer: COMMERCIAL

## 2023-10-17 ENCOUNTER — HOSPITAL ENCOUNTER (OUTPATIENT)
Dept: RADIOLOGY | Facility: OTHER | Age: 33
Discharge: HOME | End: 2023-10-17
Attending: PHYSICIAN ASSISTANT
Payer: COMMERCIAL

## 2023-10-17 VITALS
HEIGHT: 62 IN | SYSTOLIC BLOOD PRESSURE: 113 MMHG | DIASTOLIC BLOOD PRESSURE: 73 MMHG | BODY MASS INDEX: 25.58 KG/M2 | HEART RATE: 72 BPM | WEIGHT: 139 LBS

## 2023-10-17 DIAGNOSIS — R52 PAIN: ICD-10-CM

## 2023-10-17 DIAGNOSIS — S62.346A CLOSED NONDISPLACED FRACTURE OF BASE OF FIFTH METACARPAL BONE OF RIGHT HAND, INITIAL ENCOUNTER: Primary | ICD-10-CM

## 2023-10-17 PROCEDURE — 3078F PR MOST RECENT DIASTOLIC BLOOD PRESSURE < 80 MM HG: ICD-10-PCS | Mod: CPTII,S$GLB,, | Performed by: PHYSICIAN ASSISTANT

## 2023-10-17 PROCEDURE — 1159F PR MEDICATION LIST DOCUMENTED IN MEDICAL RECORD: ICD-10-PCS | Mod: CPTII,S$GLB,, | Performed by: PHYSICIAN ASSISTANT

## 2023-10-17 PROCEDURE — 1159F MED LIST DOCD IN RCRD: CPT | Mod: CPTII,S$GLB,, | Performed by: PHYSICIAN ASSISTANT

## 2023-10-17 PROCEDURE — 99999 PR PBB SHADOW E&M-EST. PATIENT-LVL III: ICD-10-PCS | Mod: PBBFAC,,, | Performed by: PHYSICIAN ASSISTANT

## 2023-10-17 PROCEDURE — 3074F PR MOST RECENT SYSTOLIC BLOOD PRESSURE < 130 MM HG: ICD-10-PCS | Mod: CPTII,S$GLB,, | Performed by: PHYSICIAN ASSISTANT

## 2023-10-17 PROCEDURE — 99024 POSTOP FOLLOW-UP VISIT: CPT | Mod: S$GLB,,, | Performed by: PHYSICIAN ASSISTANT

## 2023-10-17 PROCEDURE — 99024 PR POST-OP FOLLOW-UP VISIT: ICD-10-PCS | Mod: S$GLB,,, | Performed by: PHYSICIAN ASSISTANT

## 2023-10-17 PROCEDURE — 29075 APPL CST ELBW FNGR SHORT ARM: CPT | Mod: 58,RT,S$GLB, | Performed by: PHYSICIAN ASSISTANT

## 2023-10-17 PROCEDURE — 3074F SYST BP LT 130 MM HG: CPT | Mod: CPTII,S$GLB,, | Performed by: PHYSICIAN ASSISTANT

## 2023-10-17 PROCEDURE — 3078F DIAST BP <80 MM HG: CPT | Mod: CPTII,S$GLB,, | Performed by: PHYSICIAN ASSISTANT

## 2023-10-17 PROCEDURE — 99999 PR PBB SHADOW E&M-EST. PATIENT-LVL III: CPT | Mod: PBBFAC,,, | Performed by: PHYSICIAN ASSISTANT

## 2023-10-17 PROCEDURE — 73130 X-RAY EXAM OF HAND: CPT | Mod: 26,RT,, | Performed by: RADIOLOGY

## 2023-10-17 PROCEDURE — 73130 XR HAND COMPLETE 3 VIEW RIGHT: ICD-10-PCS | Mod: 26,RT,, | Performed by: RADIOLOGY

## 2023-10-17 PROCEDURE — 73130 X-RAY EXAM OF HAND: CPT | Mod: TC,FY,RT

## 2023-10-17 PROCEDURE — 29075 PR APPLY FOREARM CAST: ICD-10-PCS | Mod: 58,RT,S$GLB, | Performed by: PHYSICIAN ASSISTANT

## 2023-10-17 NOTE — PROGRESS NOTES
"Ms. Almazan is here today for a post-operative visit.  She is 15 days status post CRPP right 5th metacarpal base fracture by Dr. Escobar on 10/2/23. She reports that she is doing well. Pain is minimal. She reports some decreased sensation at the ulnar thumb. She denies fever, chills, and sweats since the time of the surgery.     Physical exam:    Vitals:    10/17/23 1552   BP: 113/73   Pulse: 72   Weight: 63 kg (139 lb)   Height: 5' 2" (1.575 m)     Vital signs are stable, patient is afebrile.  Patient is well dressed and well groomed, no acute distress.  Alert and oriented to person, place, and time.  Post op dressing taken down.  Pin sites clean, dry and intact.  There is no erythema or exudate.  There is no sign of any infection. She is NVI though reports decreased sensation to light touch in the thumb.    Assessment: status post CRPP right 5th metacarpal base fracture by Dr. Escobar on 10/2/23    Plan:  Chanelle was seen today for post-op evaluation.    Diagnoses and all orders for this visit:    Closed nondisplaced fracture of base of fifth metacarpal bone of right hand, initial encounter  -     Ambulatory referral/consult to Physical/Occupational Therapy; Future    PO instruction reviewed and provided to patient  Transition to right ulnar gutter fiberglass cast   Therapy ordered to begin in 4 wks   RTC 4 wks with xray plan for pin removal, bracing, and therapy       "

## 2023-10-18 DIAGNOSIS — M79.641 RIGHT HAND PAIN: Primary | ICD-10-CM

## 2023-11-08 ENCOUNTER — TELEPHONE (OUTPATIENT)
Dept: ORTHOPEDICS | Facility: CLINIC | Age: 33
End: 2023-11-08
Payer: COMMERCIAL

## 2023-11-14 ENCOUNTER — HOSPITAL ENCOUNTER (OUTPATIENT)
Dept: RADIOLOGY | Facility: OTHER | Age: 33
Discharge: HOME | End: 2023-11-14
Attending: ORTHOPAEDIC SURGERY
Payer: COMMERCIAL

## 2023-11-14 ENCOUNTER — OFFICE VISIT (OUTPATIENT)
Dept: ORTHOPEDICS | Facility: CLINIC | Age: 33
End: 2023-11-14
Payer: COMMERCIAL

## 2023-11-14 VITALS — HEIGHT: 62 IN | WEIGHT: 138.88 LBS | BODY MASS INDEX: 25.55 KG/M2

## 2023-11-14 DIAGNOSIS — M79.641 RIGHT HAND PAIN: ICD-10-CM

## 2023-11-14 DIAGNOSIS — Z98.890 POST-OPERATIVE STATE: Primary | ICD-10-CM

## 2023-11-14 DIAGNOSIS — Z98.890 POSTOPERATIVE STATE: Primary | ICD-10-CM

## 2023-11-14 PROCEDURE — 99024 POSTOP FOLLOW-UP VISIT: CPT | Mod: S$GLB,,, | Performed by: ORTHOPAEDIC SURGERY

## 2023-11-14 PROCEDURE — 1159F MED LIST DOCD IN RCRD: CPT | Mod: CPTII,S$GLB,, | Performed by: ORTHOPAEDIC SURGERY

## 2023-11-14 PROCEDURE — 73130 X-RAY EXAM OF HAND: CPT | Mod: TC,FY,RT

## 2023-11-14 PROCEDURE — 99999 PR PBB SHADOW E&M-EST. PATIENT-LVL III: ICD-10-PCS | Mod: PBBFAC,,, | Performed by: ORTHOPAEDIC SURGERY

## 2023-11-14 PROCEDURE — 99999 PR PBB SHADOW E&M-EST. PATIENT-LVL III: CPT | Mod: PBBFAC,,, | Performed by: ORTHOPAEDIC SURGERY

## 2023-11-14 PROCEDURE — 1159F PR MEDICATION LIST DOCUMENTED IN MEDICAL RECORD: ICD-10-PCS | Mod: CPTII,S$GLB,, | Performed by: ORTHOPAEDIC SURGERY

## 2023-11-14 PROCEDURE — 73130 X-RAY EXAM OF HAND: CPT | Mod: 26,RT,, | Performed by: RADIOLOGY

## 2023-11-14 PROCEDURE — 73130 XR HAND COMPLETE 3 VIEW RIGHT: ICD-10-PCS | Mod: 26,RT,, | Performed by: RADIOLOGY

## 2023-11-14 PROCEDURE — 99024 PR POST-OP FOLLOW-UP VISIT: ICD-10-PCS | Mod: S$GLB,,, | Performed by: ORTHOPAEDIC SURGERY

## 2023-11-14 NOTE — PROGRESS NOTES
"Ms. Almazan is here today for a post-operative visit.  She is 6 weeks status post CRPP right 5th metacarpal base fracture by Dr. Escobar on 10/2/23. She reports that she is doing well. Pain is minimal. She reports some decreased sensation at the ulnar thumb. She denies fever, chills, and sweats since the time of the surgery. She states its stiff due to being in the cast    Physical exam:    Vitals:    11/14/23 1600   Weight: 63 kg (138 lb 14.2 oz)   Height: 5' 2" (1.575 m)   PainSc: 0-No pain     Vital signs are stable, patient is afebrile.  Patient is well dressed and well groomed, no acute distress.  Alert and oriented to person, place, and time.  Post op dressing taken down.  Pin sites clean, dry and intact.  There is no erythema or exudate.  There is no sign of any infection. She is NVI though reports decreased sensation to light touch in the thumb.    Assessment: status post CRPP right 5th metacarpal base fracture by Dr. Escobar on 10/2/23    Plan:  PO instructions and precautions provided to patient  - Transition to Ulnar Gutter Brace for R Hand  - Beginning Therapy on 11/16/23  - no lifting pushing pulling.  - follow-up in clinic in 4 weeks with repeat x-rays    "

## 2023-11-16 ENCOUNTER — TELEPHONE (OUTPATIENT)
Dept: ORTHOPEDICS | Facility: CLINIC | Age: 33
End: 2023-11-16
Payer: COMMERCIAL

## 2023-11-16 NOTE — TELEPHONE ENCOUNTER
Spoke with pt r/s her appt to 12/12/23 due to needing a 4wk f/u appt was to far out       ----- Message from Tabitha Quick sent at 11/16/2023  3:58 PM CST -----  Regarding: Appointment  Name of Who is Calling:  Patient          What is the request in detail:  Please contact patient she has questions about her 12/27/2023 appointment            Can the clinic reply by [facility]: Yes            What Number to Call Back if not in [facility]: [phone]

## 2023-12-11 ENCOUNTER — TELEPHONE (OUTPATIENT)
Dept: ORTHOPEDICS | Facility: CLINIC | Age: 33
End: 2023-12-11
Payer: COMMERCIAL

## 2023-12-12 ENCOUNTER — OFFICE VISIT (OUTPATIENT)
Dept: ORTHOPEDICS | Facility: CLINIC | Age: 33
End: 2023-12-12
Payer: COMMERCIAL

## 2023-12-12 ENCOUNTER — HOSPITAL ENCOUNTER (OUTPATIENT)
Dept: RADIOLOGY | Facility: OTHER | Age: 33
Discharge: HOME | End: 2023-12-12
Attending: ORTHOPAEDIC SURGERY
Payer: COMMERCIAL

## 2023-12-12 VITALS — HEIGHT: 62 IN | BODY MASS INDEX: 25.55 KG/M2 | WEIGHT: 138.88 LBS

## 2023-12-12 DIAGNOSIS — Z98.890 POST-OPERATIVE STATE: ICD-10-CM

## 2023-12-12 DIAGNOSIS — S62.346A CLOSED NONDISPLACED FRACTURE OF BASE OF FIFTH METACARPAL BONE OF RIGHT HAND, INITIAL ENCOUNTER: ICD-10-CM

## 2023-12-12 DIAGNOSIS — Z98.890 POST-OPERATIVE STATE: Primary | ICD-10-CM

## 2023-12-12 PROCEDURE — 73130 XR HAND COMPLETE 3 VIEW RIGHT: ICD-10-PCS | Mod: 26,RT,, | Performed by: RADIOLOGY

## 2023-12-12 PROCEDURE — 1159F MED LIST DOCD IN RCRD: CPT | Mod: CPTII,S$GLB,, | Performed by: SPECIALIST/TECHNOLOGIST

## 2023-12-12 PROCEDURE — 99999 PR PBB SHADOW E&M-EST. PATIENT-LVL III: ICD-10-PCS | Mod: PBBFAC,,, | Performed by: SPECIALIST/TECHNOLOGIST

## 2023-12-12 PROCEDURE — 99024 POSTOP FOLLOW-UP VISIT: CPT | Mod: S$GLB,,, | Performed by: SPECIALIST/TECHNOLOGIST

## 2023-12-12 PROCEDURE — 73130 X-RAY EXAM OF HAND: CPT | Mod: TC,FY,RT

## 2023-12-12 PROCEDURE — 99024 PR POST-OP FOLLOW-UP VISIT: ICD-10-PCS | Mod: S$GLB,,, | Performed by: SPECIALIST/TECHNOLOGIST

## 2023-12-12 PROCEDURE — 1159F PR MEDICATION LIST DOCUMENTED IN MEDICAL RECORD: ICD-10-PCS | Mod: CPTII,S$GLB,, | Performed by: SPECIALIST/TECHNOLOGIST

## 2023-12-12 PROCEDURE — 73130 X-RAY EXAM OF HAND: CPT | Mod: 26,RT,, | Performed by: RADIOLOGY

## 2023-12-12 PROCEDURE — 99999 PR PBB SHADOW E&M-EST. PATIENT-LVL III: CPT | Mod: PBBFAC,,, | Performed by: SPECIALIST/TECHNOLOGIST

## 2023-12-12 NOTE — PROGRESS NOTES
"Ms. Almazan is here today for a post-operative visit.  She is 12 weeks status post CRPP right 5th metacarpal base fracture by Dr. Escobar on 10/2/23. She reports that she is doing well. Pain is minimal. Reports she has been going to some therapy, but has missed a couple of sessions. She states she has been wearing her brace full-time unless she is doing therapy.  She denies fever, chills, and sweats since the time of the surgery. She states its stiff due to being in the cast    Physical exam:    Vitals:    12/12/23 1509   Weight: 63 kg (138 lb 14.2 oz)   Height: 5' 2" (1.575 m)   PainSc:   5   PainLoc: Hand     Vital signs are stable, patient is afebrile.  Patient is well dressed and well groomed, no acute distress.  Alert and oriented to person, place, and time.  Pin sites clean, dry and intact.  There is no erythema or exudate.  There is no sign of any infection. She is NVI.  She is very stiff through the ring and small finger.  Unable to make a composite fist.    Assessment: status post CRPP right 5th metacarpal base fracture by Dr. Escobar on 10/2/23    Plan:  PO instructions and precautions provided to patient  Educated patient on range-of-motion exercises for the PIP and D IP as well as the MCP joint.  She is to continue regular therapy and home exercise programs to make a composite fist.  She can also begin strength progression.  We will follow up with her in 6 weeks with repeat x-rays to assess her range of motion.  "

## 2024-01-16 ENCOUNTER — TELEPHONE (OUTPATIENT)
Dept: ORTHOPEDICS | Facility: CLINIC | Age: 34
End: 2024-01-16
Payer: COMMERCIAL

## 2024-01-23 ENCOUNTER — HOSPITAL ENCOUNTER (OUTPATIENT)
Dept: RADIOLOGY | Facility: OTHER | Age: 34
Discharge: HOME | End: 2024-01-23
Attending: SPECIALIST/TECHNOLOGIST
Payer: COMMERCIAL

## 2024-01-23 ENCOUNTER — OFFICE VISIT (OUTPATIENT)
Dept: ORTHOPEDICS | Facility: CLINIC | Age: 34
End: 2024-01-23
Payer: COMMERCIAL

## 2024-01-23 VITALS — HEIGHT: 62 IN | BODY MASS INDEX: 25.55 KG/M2 | WEIGHT: 138.88 LBS

## 2024-01-23 DIAGNOSIS — Z98.890 POST-OPERATIVE STATE: ICD-10-CM

## 2024-01-23 DIAGNOSIS — Z98.890 POST-OPERATIVE STATE: Primary | ICD-10-CM

## 2024-01-23 DIAGNOSIS — S62.346A CLOSED NONDISPLACED FRACTURE OF BASE OF FIFTH METACARPAL BONE OF RIGHT HAND, INITIAL ENCOUNTER: ICD-10-CM

## 2024-01-23 PROCEDURE — 99999 PR PBB SHADOW E&M-EST. PATIENT-LVL III: CPT | Mod: PBBFAC,,, | Performed by: SPECIALIST/TECHNOLOGIST

## 2024-01-23 PROCEDURE — 73130 X-RAY EXAM OF HAND: CPT | Mod: TC,FY,RT

## 2024-01-23 PROCEDURE — 99213 OFFICE O/P EST LOW 20 MIN: CPT | Mod: S$GLB,,, | Performed by: SPECIALIST/TECHNOLOGIST

## 2024-01-23 PROCEDURE — 1159F MED LIST DOCD IN RCRD: CPT | Mod: CPTII,S$GLB,, | Performed by: SPECIALIST/TECHNOLOGIST

## 2024-01-23 PROCEDURE — 73130 X-RAY EXAM OF HAND: CPT | Mod: 26,RT,, | Performed by: RADIOLOGY

## 2024-01-23 PROCEDURE — 3008F BODY MASS INDEX DOCD: CPT | Mod: CPTII,S$GLB,, | Performed by: SPECIALIST/TECHNOLOGIST

## 2024-01-23 NOTE — PROGRESS NOTES
"Ms. Almazan is here today for a post-operative visit.  She is 3 months status post CRPP right 5th metacarpal base fracture by Dr. Escobar on 10/2/23. She reports that she is no pain.  She reports that she has been compliant in doing her home exercise programs well as going to the therapy.  Her last therapy appointment was January 5th in her last appointment was canceled due to cold weather.  She denies fever, chills, and sweats since the time of the surgery.     Physical exam:    Vitals:    01/23/24 1531   Weight: 63 kg (138 lb 14.2 oz)   Height: 5' 2" (1.575 m)   PainSc: 0-No pain     Vital signs are stable, patient is afebrile.  Patient is well dressed and well groomed, no acute distress.  Alert and oriented to person, place, and time.  Pin sites clean, dry and intact.  There is no erythema or exudate.  There is no sign of any infection. She is NVI.  Stiffness noted through the 4th and 5th digit although improved from since last visit.  She is able to passively get to a composite fist.    Assessment: status post CRPP right 5th metacarpal base fracture by Dr. Escobar on 10/2/23    Plan:  Educated patient on range-of-motion exercises for the PIP and D IP as well as the MCP joint.  She is to continue regular therapy and home exercise programs to make a composite fist.  She can also begin strength progression.  We will follow up with her in 6 weeks to assess her range of motion.  "

## 2024-03-15 NOTE — ANESTHESIA POSTPROCEDURE EVALUATION
Anesthesia Post Evaluation    Patient: Chanelle Almazan    Procedure(s) Performed: Procedure(s) (LRB):  CLOSED REDUCTION, FRACTURE, METACARPAL BONE, right 5th metacarpal base (Right)    Final Anesthesia Type: general      Patient location during evaluation: PACU  Patient participation: Yes- Able to Participate  Level of consciousness: awake and alert  Post-procedure vital signs: reviewed and stable  Pain management: adequate  Airway patency: patent  ARACELY mitigation strategies: Multimodal analgesia  PONV status at discharge: No PONV  Anesthetic complications: no      Cardiovascular status: blood pressure returned to baseline  Respiratory status: unassisted  Hydration status: euvolemic  Follow-up not needed.          Vitals Value Taken Time   /73 10/02/23 1017   Temp 36.5 °C (97.7 °F) 10/02/23 0946   Pulse 72 10/02/23 1030   Resp 20 10/02/23 1030   SpO2 97 % 10/02/23 1030         Event Time   Out of Recovery 10:39:00         Pain/Cipriano Score: No data recorded       Pt counseled re: unchanged exam after almost 4 hours. IUPC placed to better assess adequacy of contractions. AROM at 12:30pm-clear  IV benadryl given to attempt to thin out cervix  Cat 1 tracin bpm/mod georgina/+accel/-decels.  Will continue to titrate pitocin placement of peanut ball.  Reviewed indications for failed induction (>12h of AROM and pitocin and unchanged cervix), or maternal or fetal distress. All questions and concerns addressed    JAYNA Limon MD

## 2024-11-18 ENCOUNTER — LAB VISIT (OUTPATIENT)
Dept: LAB | Facility: HOSPITAL | Age: 34
End: 2024-11-18
Attending: FAMILY MEDICINE
Payer: COMMERCIAL

## 2024-11-18 ENCOUNTER — OFFICE VISIT (OUTPATIENT)
Dept: FAMILY MEDICINE | Facility: CLINIC | Age: 34
End: 2024-11-18
Payer: COMMERCIAL

## 2024-11-18 VITALS
DIASTOLIC BLOOD PRESSURE: 60 MMHG | SYSTOLIC BLOOD PRESSURE: 118 MMHG | OXYGEN SATURATION: 98 % | HEART RATE: 92 BPM | BODY MASS INDEX: 25.19 KG/M2 | HEIGHT: 62 IN | WEIGHT: 136.88 LBS

## 2024-11-18 DIAGNOSIS — F32.1 MODERATE MAJOR DEPRESSION, SINGLE EPISODE: Primary | ICD-10-CM

## 2024-11-18 DIAGNOSIS — Z00.00 HEALTHCARE MAINTENANCE: ICD-10-CM

## 2024-11-18 PROBLEM — S62.316A CLOSED DISPLACED FRACTURE OF BASE OF FIFTH METACARPAL BONE OF RIGHT HAND: Status: RESOLVED | Noted: 2023-10-01 | Resolved: 2024-11-18

## 2024-11-18 PROBLEM — M25.561 ACUTE PAIN OF RIGHT KNEE: Status: RESOLVED | Noted: 2022-12-20 | Resolved: 2024-11-18

## 2024-11-18 PROBLEM — S20.211A RIB CONTUSION, RIGHT, INITIAL ENCOUNTER: Status: RESOLVED | Noted: 2022-12-20 | Resolved: 2024-11-18

## 2024-11-18 LAB
BILIRUB UR QL STRIP: NEGATIVE
CLARITY UR REFRACT.AUTO: CLEAR
COLOR UR AUTO: COLORLESS
GLUCOSE UR QL STRIP: NEGATIVE
HGB UR QL STRIP: NEGATIVE
KETONES UR QL STRIP: NEGATIVE
LEUKOCYTE ESTERASE UR QL STRIP: NEGATIVE
NITRITE UR QL STRIP: NEGATIVE
PH UR STRIP: 7 [PH] (ref 5–8)
PROT UR QL STRIP: NEGATIVE
SP GR UR STRIP: 1 (ref 1–1.03)
URN SPEC COLLECT METH UR: ABNORMAL

## 2024-11-18 PROCEDURE — 3074F SYST BP LT 130 MM HG: CPT | Mod: CPTII,S$GLB,, | Performed by: FAMILY MEDICINE

## 2024-11-18 PROCEDURE — 3008F BODY MASS INDEX DOCD: CPT | Mod: CPTII,S$GLB,, | Performed by: FAMILY MEDICINE

## 2024-11-18 PROCEDURE — 99999 PR PBB SHADOW E&M-EST. PATIENT-LVL III: CPT | Mod: PBBFAC,,, | Performed by: FAMILY MEDICINE

## 2024-11-18 PROCEDURE — 1160F RVW MEDS BY RX/DR IN RCRD: CPT | Mod: CPTII,S$GLB,, | Performed by: FAMILY MEDICINE

## 2024-11-18 PROCEDURE — 3078F DIAST BP <80 MM HG: CPT | Mod: CPTII,S$GLB,, | Performed by: FAMILY MEDICINE

## 2024-11-18 PROCEDURE — 99203 OFFICE O/P NEW LOW 30 MIN: CPT | Mod: S$GLB,,, | Performed by: FAMILY MEDICINE

## 2024-11-18 PROCEDURE — 1159F MED LIST DOCD IN RCRD: CPT | Mod: CPTII,S$GLB,, | Performed by: FAMILY MEDICINE

## 2024-11-18 PROCEDURE — 81003 URINALYSIS AUTO W/O SCOPE: CPT | Performed by: FAMILY MEDICINE

## 2024-11-18 PROCEDURE — 3044F HG A1C LEVEL LT 7.0%: CPT | Mod: CPTII,S$GLB,, | Performed by: FAMILY MEDICINE

## 2024-11-18 RX ORDER — PAROXETINE HYDROCHLORIDE 20 MG/1
20 TABLET, FILM COATED ORAL EVERY MORNING
Qty: 30 TABLET | Refills: 1 | Status: SHIPPED | OUTPATIENT
Start: 2024-11-18

## 2024-11-18 NOTE — PROGRESS NOTES
.Subjective     Patient ID: Chanelle Almazan is a 34 y.o. female.    Chief Complaint: Establish Care, Depression, and ADHD    HPI  History of Present Illness    CHIEF COMPLAINT:  Chanelle presents today to establish care for ADHD and depression.    ADHD:  She was initially diagnosed with ADHD at age 10 and took medication throughout her school years. After discontinuing medication post-high school, believing she had outgrown the condition, she was re-diagnosed at age 24 due to work-related difficulties. She is currently not taking any ADHD medication.    DEPRESSION:  She reports symptoms consistent with depression, including staying in bed for days, being easily triggered, and experiencing feelings of worthlessness. Her energy levels fluctuate significantly, with periods of high motivation followed by sudden exhaustion. She notes difficulty with concentration and inconsistent appetite.    SLEEP:  She reports improved sleep over the past year despite frequent tossing and turning during the night. She goes to bed early and feels more rested now than ever before.    FAMILY HISTORY:  Her brother passed away in 2017 at the age of 29 due to leukemia.    SOCIAL HISTORY:  She is a single mother of three children, raising them by herself. Father of her two youngest boys was recently released from assisted after a five-year sentence and is currently homeless with no involvement in the children's lives.    MEDICATION HISTORY:  She previously tried Prozac and Lexapro for anxiety, each for approximately six months, but reported no significant improvement in symptoms.    URINARY SYMPTOMS:  She reports strong-smelling urine but denies any other associated urinary symptoms.      ROS:  General: -fever, -chills, +fatigue, -weight gain, -weight loss  Eyes: -vision changes, -redness, -discharge  ENT: -ear pain, -nasal congestion, -sore throat  Cardiovascular: -chest pain, -palpitations, -lower extremity edema  Respiratory: -cough, -shortness  of breath  Gastrointestinal: -abdominal pain, -nausea, -vomiting, -diarrhea, -constipation, -blood in stool  Genitourinary: -dysuria, -hematuria, -frequency, +urine changes  Musculoskeletal: -joint pain, -muscle pain  Skin: -rash, -lesion  Neurological: -headache, -dizziness, -numbness, -tingling  Psychiatric: -anxiety, +depression, -sleep difficulty            Objective     Vitals:    11/18/24 1549   BP: 118/60   Pulse: 92        Current Outpatient Medications   Medication Sig Dispense Refill    paroxetine (PAXIL) 20 MG tablet Take 1 tablet (20 mg total) by mouth every morning. 30 tablet 1     No current facility-administered medications for this visit.        Physical Exam    General: No acute distress. Well-developed. Well-nourished.  Eyes: EOMI. Sclerae anicteric.  HENT: Normocephalic. Atraumatic. Nares patent. Moist oral mucosa.  Ears: Bilateral TMs clear. Bilateral EACs clear.  Cardiovascular: Regular rate. Regular rhythm. No murmurs. No rubs. No gallops. Normal S1, S2.  Respiratory: Normal respiratory effort. Clear to auscultation bilaterally. No rales. No rhonchi. No wheezing.  Abdomen: Soft. Non-tender. Non-distended. Normoactive bowel sounds.  Musculoskeletal: No  obvious deformity.  Extremities: No lower extremity edema.  Neurological: Alert & oriented x3. No slurred speech. Normal gait.  Psychiatric: Normal mood. Normal affect. Good insight. Good judgment.  Skin: Warm. Dry. No rash.          Assessment and Plan     Moderate major depression, single episode  -     paroxetine (PAXIL) 20 MG tablet; Take 1 tablet (20 mg total) by mouth every morning.  Dispense: 30 tablet; Refill: 1    Healthcare maintenance  -     CBC Auto Differential; Future; Expected date: 11/18/2024  -     Comprehensive Metabolic Panel; Future; Expected date: 11/18/2024  -     Hemoglobin A1C; Future; Expected date: 11/18/2024  -     Lipid Panel; Future; Expected date: 11/18/2024  -     TSH; Future; Expected date: 11/18/2024  -      Urinalysis, Reflex to Urine Culture Urine, Clean Catch; Future; Expected date: 11/18/2024     Assessment & Plan    Patient presents with symptoms suggestive of depression  History of ADHD diagnosis, but current symptoms more consistent with depression  Previous trials of Prozac and Lexapro were ineffective  Considering Paxil as next medication option  Ruled out immediate need for mood stabilizers or psychiatric referral    MAJOR DEPRESSIVE DISORDER:  - Explained that depression can interfere with ability to function, mimicking ADHD symptoms.  - Discussed the trial and error nature of finding effective antidepressant medication.  - Informed about the typical onset of action for antidepressants (2-3 weeks for maximum effectiveness).  - Started Paxil 20 mg tablets.  - Take 1/2 tablet daily for first 2 weeks, then increase to 1 tablet daily after 2 weeks.  - Provided 30 tablets with 1 refill.    ATTENTION AND CONCENTRATION DEFICIT:  - Explained that depression can interfere with ability to function, mimicking ADHD symptoms.    LABS:  - Ordered comprehensive metabolic panel.  - Ordered urinalysis to check for protein, blood, infection, and sugar.  - Results of lab work will be released through [facility] portal with provider's comments.    FOLLOW-UP:  - Follow up in 4-6 weeks.           This note was generated with the assistance of ambient listening technology. Verbal consent was obtained by the patient and accompanying visitor(s) for the recording of patient appointment to facilitate this note. I attest to having reviewed and edited the generated note for accuracy, though some syntax or spelling errors may persist. Please contact the author of this note for any clarification.

## 2025-01-03 DIAGNOSIS — F32.1 MODERATE MAJOR DEPRESSION, SINGLE EPISODE: ICD-10-CM

## 2025-01-03 RX ORDER — PAROXETINE HYDROCHLORIDE 20 MG/1
20 TABLET, FILM COATED ORAL EVERY MORNING
Qty: 30 TABLET | Refills: 0 | Status: SHIPPED | OUTPATIENT
Start: 2025-01-03

## 2025-02-14 ENCOUNTER — OFFICE VISIT (OUTPATIENT)
Dept: FAMILY MEDICINE | Facility: CLINIC | Age: 35
End: 2025-02-14

## 2025-02-14 ENCOUNTER — PATIENT OUTREACH (OUTPATIENT)
Dept: ADMINISTRATIVE | Facility: OTHER | Age: 35
End: 2025-02-14

## 2025-02-14 VITALS
SYSTOLIC BLOOD PRESSURE: 126 MMHG | HEIGHT: 62 IN | BODY MASS INDEX: 24.78 KG/M2 | DIASTOLIC BLOOD PRESSURE: 72 MMHG | WEIGHT: 134.69 LBS | OXYGEN SATURATION: 98 % | HEART RATE: 84 BPM

## 2025-02-14 DIAGNOSIS — F32.1 MODERATE MAJOR DEPRESSION, SINGLE EPISODE: Primary | ICD-10-CM

## 2025-02-14 PROCEDURE — 99999 PR PBB SHADOW E&M-EST. PATIENT-LVL III: CPT | Mod: PBBFAC,,, | Performed by: FAMILY MEDICINE

## 2025-02-14 PROCEDURE — 99213 OFFICE O/P EST LOW 20 MIN: CPT | Mod: PBBFAC,PO | Performed by: FAMILY MEDICINE

## 2025-02-14 RX ORDER — PAROXETINE HYDROCHLORIDE 20 MG/1
20 TABLET, FILM COATED ORAL EVERY MORNING
Qty: 90 TABLET | Refills: 1 | Status: SHIPPED | OUTPATIENT
Start: 2025-02-14

## 2025-02-14 NOTE — PROGRESS NOTES
.Subjective     Patient ID: Chanelle Almazan is a 34 y.o. female.    Chief Complaint: Depression    HPI  History of Present Illness    CHIEF COMPLAINT:  Chanelle presents today for depression medication follow up.    MEDICATION RESPONSE:  She reports significant improvement in symptoms with current medication regimen without any side effects. Family members have noted positive changes. Her fiance assists with medication compliance by providing reminders.    ROS:  General: -fever, -chills, -fatigue, -weight gain, -weight loss  Eyes: -vision changes, -redness, -discharge  ENT: -ear pain, -nasal congestion, -sore throat  Cardiovascular: -chest pain, -palpitations, -lower extremity edema  Respiratory: -cough, -shortness of breath  Gastrointestinal: -abdominal pain, -nausea, -vomiting, -diarrhea, -constipation, -blood in stool  Genitourinary: -dysuria, -hematuria, -frequency  Musculoskeletal: -joint pain, -muscle pain  Skin: -rash, -lesion  Neurological: -headache, -dizziness, -numbness, -tingling  Psychiatric: -anxiety, -depression, -sleep difficulty        Objective     Vitals:    02/14/25 1004   BP: 126/72   Pulse: 84      Current Outpatient Medications   Medication Sig Dispense Refill    paroxetine (PAXIL) 20 MG tablet Take 1 tablet (20 mg total) by mouth every morning. 90 tablet 1     No current facility-administered medications for this visit.      Physical Exam    General: No acute distress. Well-developed. Well-nourished.  Eyes: EOMI. Sclerae anicteric.  HENT: Normocephalic. Atraumatic. Nares patent. Moist oral mucosa.  Ears: Bilateral TMs clear. Bilateral EACs clear.  Cardiovascular: Regular rate. Regular rhythm. No murmurs. No rubs. No gallops. Normal S1, S2.  Respiratory: Normal respiratory effort. Clear to auscultation bilaterally. No rales. No rhonchi. No wheezing.  Abdomen: Soft. Non-tender. Non-distended. Normoactive bowel sounds.  Musculoskeletal: No  obvious deformity.  Extremities: No lower extremity  edema.  Neurological: Alert & oriented x3. No slurred speech. Normal gait.  Psychiatric: Normal mood. Normal affect. Good insight. Good judgment.  Skin: Warm. Dry. No rash.        Assessment and Plan     Moderate major depression, single episode  - Teaching reviewed on MDD including coping skills, safety plan and treatment options.  Continue Paxil 20mg daily.  Declines offer for counseling.   -     paroxetine (PAXIL) 20 MG tablet; Take 1 tablet (20 mg total) by mouth every morning.  Dispense: 90 tablet; Refill: 1    Assessment & Plan    IMPRESSION:  - Patient reports significant symptom improvement and positive response to current medication regimen  - No side effects reported  - Continued current treatment plan given excellent response and patient satisfaction    PLAN SUMMARY:  - Continue current medication regimen  - Prescribed 6-month supply of medication (3-month pickup intervals at [facility] in [facility])  - Follow-up appointment scheduled in 6 months  - Chanelle advised to contact office earlier if needed    MEDICATION MANAGEMENT:  - Evaluated the patient's current condition and noted significant improvement in all symptoms, with no reported side effects from the medication.  - Acknowledged the effectiveness of the current medication regimen.  - Educated the patient about the importance of medication adherence, even when feeling well.  - Explained the common misconception that medication is no longer needed once symptoms improve.  - Continued the current medication regimen.  - Prescribed a 6-month supply of medication with instructions to  3 months at a time from [facility] in [facility].    OTHER INSTRUCTIONS:  - Auscultated the patient's heart and found no issues.    FOLLOW UP:  - Scheduled a follow-up visit in 6 months.  - Advised the patient to contact the office earlier if needed before the scheduled appointment.         This note was generated with the assistance of ambient listening technology. Verbal consent  was obtained by the patient and accompanying visitor(s) for the recording of patient appointment to facilitate this note. I attest to having reviewed and edited the generated note for accuracy, though some syntax or spelling errors may persist. Please contact the author of this note for any clarification.

## 2025-02-19 NOTE — PROGRESS NOTES
CHW - Case Closure    This Community Health Worker spoke to patient via telephone today.   Pt/Caregiver reported: Pt denied needing any ss assistance at this time. SDOH completed verified updated, next appt confirmed  Pt/Caregiver denied any additional needs at this time and agrees with episode closure at this time.  Provided patient with Community Health Worker's contact information and encouraged him/her to contact this Community Health Worker if additional needs arise.

## 2025-03-12 ENCOUNTER — OFFICE VISIT (OUTPATIENT)
Dept: OBSTETRICS AND GYNECOLOGY | Facility: CLINIC | Age: 35
End: 2025-03-12
Payer: COMMERCIAL

## 2025-03-12 ENCOUNTER — PATIENT MESSAGE (OUTPATIENT)
Dept: OBSTETRICS AND GYNECOLOGY | Facility: CLINIC | Age: 35
End: 2025-03-12

## 2025-03-12 VITALS
DIASTOLIC BLOOD PRESSURE: 61 MMHG | WEIGHT: 135.13 LBS | SYSTOLIC BLOOD PRESSURE: 111 MMHG | BODY MASS INDEX: 24.72 KG/M2

## 2025-03-12 DIAGNOSIS — N94.6 DYSMENORRHEA: ICD-10-CM

## 2025-03-12 DIAGNOSIS — R68.82 LOW LIBIDO: ICD-10-CM

## 2025-03-12 DIAGNOSIS — N92.0 MENORRHAGIA WITH REGULAR CYCLE: Primary | ICD-10-CM

## 2025-03-12 PROCEDURE — 99999 PR PBB SHADOW E&M-EST. PATIENT-LVL III: CPT | Mod: PBBFAC,,,

## 2025-03-12 PROCEDURE — 3008F BODY MASS INDEX DOCD: CPT | Mod: CPTII,S$GLB,,

## 2025-03-12 PROCEDURE — 99213 OFFICE O/P EST LOW 20 MIN: CPT | Mod: S$GLB,,,

## 2025-03-12 PROCEDURE — 1159F MED LIST DOCD IN RCRD: CPT | Mod: CPTII,S$GLB,,

## 2025-03-12 PROCEDURE — 3074F SYST BP LT 130 MM HG: CPT | Mod: CPTII,S$GLB,,

## 2025-03-12 PROCEDURE — 3078F DIAST BP <80 MM HG: CPT | Mod: CPTII,S$GLB,,

## 2025-03-12 PROCEDURE — 1160F RVW MEDS BY RX/DR IN RCRD: CPT | Mod: CPTII,S$GLB,,

## 2025-03-12 NOTE — PROGRESS NOTES
SUBJECTIVE:   34 y.o. female  presents for concerns of low libido which onset since starting Paxil for MMD. Reports never having a high sex drive, but partner and her have noticed a decrease since starting SSRI. Denies any pain with sex, decreased lubrication, vaginal dryness. She is engaged and wedding is soon. This is affecting her relationship and causing distress.   She is also c/o painful and heavy menstrual cycles since bilateral salpingectomy 2022.  Patient's last menstrual period was 2025 (exact date).. Age of first menarche: 12 years old. Periods are regular, heavy, lasting 5-7 days. +severe dysmenorrhea. Heaviest days are 1-3, wearing pads and tampons together, changing tampons every 20-30 min. She also reports occasional accidents.     Is currently sexually active. Bilateral salpingectomy for contraception.   Denies any breast, urinary, or bowel complaints.       Pap 2023-normal, HPV negative in 10/2020-due for pap next year with HPV           Past Medical History:   Diagnosis Date    Rib contusion, right, initial encounter 2022     Past Surgical History:   Procedure Laterality Date    CLOSED REDUCTION OF INJURY OF METACARPAL BONE Right 10/2/2023    Procedure: CLOSED REDUCTION, FRACTURE, METACARPAL BONE, right 5th metacarpal base;  Surgeon: Shagufta Escobar MD;  Location: [facility] OR;  Service: Orthopedics;  Laterality: Right;  regional MAC    DILATION AND CURETTAGE OF UTERUS  2018    LAPAROSCOPIC SALPINGECTOMY Bilateral 2022    Procedure: SALPINGECTOMY, LAPAROSCOPIC;  Surgeon: Brenda Howard MD;  Location: [facility] OR;  Service: OB/GYN;  Laterality: Bilateral;    TONSILLECTOMY, ADENOIDECTOMY       Social History[1]  Family History   Problem Relation Name Age of Onset    Colon cancer Paternal Grandmother      Prostate cancer Maternal Grandfather      Heart attack Maternal Grandfather      Diabetes Mother      Breast cancer Neg Hx      Ovarian cancer Neg Hx       OB History  "   Para Term  AB Living   4 3 3  1 3   SAB IAB Ectopic Multiple Live Births    1   3      # Outcome Date GA Lbr Danny/2nd Weight Sex Type Anes PTL Lv   4 IAB            3 Term      Vag-Spont  N BENOIT   2 Term      Vag-Spont  N BENOIT   1 Term      Vag-Spont  N BENOIT         Current Medications[2]  Allergies: Patient has no known allergies.     ROS:  Constitutional: no weight loss, weight gain, fever, fatigue  Eyes:  No vision changes, glasses/contacts  ENT/Mouth: No ulcers, sinus problems, ears ringing, headache  Cardiovascular: No inability to lie flat, chest pain, exercise intolerance, swelling, heart palpitations  Respiratory: No wheezing, coughing blood, shortness of breath, or cough  Gastrointestinal: No diarrhea, bloody stool, nausea/vomiting, constipation, gas, hemorrhoids  Genitourinary: See HPI  Musculoskeletal: No muscle weakness  Skin/Breast: No painful breasts, nipple discharge, masses, rash, ulcers  Neurological: No passing out, seizures, numbness, headache  Endocrine: No hot flashes, hair loss, abnormal hair growth, ance  Psychiatric: No depression, crying  Hematologic: No bruises, bleeding, swollen lymph nodes, anemia.      OBJECTIVE:   The patient appears well, alert, oriented x 3, in no distress.  /61   Wt 61.3 kg (135 lb 2.3 oz)   LMP 2025 (Exact Date)   BMI 24.72 kg/m²   Physical exam: deferred       ASSESSMENT:   Chanelle Almazan" was seen today for annual exam.    Diagnoses and all orders for this visit:    Menorrhagia with regular cycle  -     US Pelvis Comp with Transvag NON-OB (xpd; Future    Dysmenorrhea    Low libido  -     US Pelvis Comp with Transvag NON-OB (xpd; Future        Orders Placed This Encounter   Procedures    US Pelvis Comp with Transvag NON-OB (xpd     - counseled patient on possible contributing factors of sexual dysfunction: relationship, fatigue, stress, structural abnormalities/disease (fibroids, endometriosis), medications, substance abuse, " menopause  - discussed non-medication interventions: counseling/therapy, lifestyle changes, addressing pelvic floor dysfunction (PT)  - counseled patient on medication options  Flibanserin/Addyi (serotonin receptor agonist/antagonist) 100mg daily at bedtime. Side effects hypotension, syncope, do not take with alcohol. Contraindicated with liver disease.   Bremelanotide/Vyleesi (melanocortin receptor agnosit) subQ injection at least 45min before anticipated sexual activity. Side effects: nausea, flushing, HA, hyperpigmentation, transient HTN. Do not use if pregnant. Contraindicated known cardiac disease. Use no more than 1 dose in 24 hours and 8 doses in 1 month.   Buproprion: off label use, 300mg/day in AM. Side effects: anxiety, insomnia, HTN    Discussed changing from paxil to buproprion since low libido is causing distress to relationship. She would like to discuss with PCP.    Discussed management of menorrhagia.  US ordered.   Discussed OCP's, vaginal ring, patches, IUD, implant. Also discussed ablation. Will discuss after US results come back.       Follow up in 1 year for annual exam or as needed.  Karely Garg PA-C         [1]   Social History  Socioeconomic History    Marital status: Single   Tobacco Use    Smoking status: Never    Smokeless tobacco: Never   Substance and Sexual Activity    Alcohol use: Yes     Comment: occasionaly    Drug use: No    Sexual activity: Yes     Partners: Male     Birth control/protection: See Surgical Hx     Social Drivers of Health     Financial Resource Strain: Low Risk  (2/19/2025)    Overall Financial Resource Strain (CARDIA)     Difficulty of Paying Living Expenses: Not hard at all   Food Insecurity: No Food Insecurity (2/19/2025)    Hunger Vital Sign     Worried About Running Out of Food in the Last Year: Never true     Ran Out of Food in the Last Year: Never true   Transportation Needs: No Transportation Needs (2/19/2025)    PRAPARE - Transportation     Lack of  Transportation (Medical): No     Lack of Transportation (Non-Medical): No   Physical Activity: Sufficiently Active (2/19/2025)    Exercise Vital Sign     Days of Exercise per Week: 7 days     Minutes of Exercise per Session: 70 min   Stress: No Stress Concern Present (2/19/2025)    [employer] [place] [employer] - Occupational Stress Questionnaire     Feeling of Stress : Only a little   Housing Stability: Low Risk  (2/19/2025)    Housing Stability Vital Sign     Unable to Pay for Housing in the Last Year: No     Homeless in the Last Year: No   [2]   Current Outpatient Medications   Medication Sig Dispense Refill    paroxetine (PAXIL) 20 MG tablet Take 1 tablet (20 mg total) by mouth every morning. 90 tablet 1     No current facility-administered medications for this visit.

## 2025-05-06 ENCOUNTER — OFFICE VISIT (OUTPATIENT)
Dept: FAMILY MEDICINE | Facility: CLINIC | Age: 35
End: 2025-05-06
Payer: COMMERCIAL

## 2025-05-06 VITALS
OXYGEN SATURATION: 99 % | BODY MASS INDEX: 25.72 KG/M2 | DIASTOLIC BLOOD PRESSURE: 78 MMHG | HEIGHT: 62 IN | SYSTOLIC BLOOD PRESSURE: 118 MMHG | WEIGHT: 139.75 LBS | HEART RATE: 81 BPM

## 2025-05-06 DIAGNOSIS — M25.511 ACUTE PAIN OF RIGHT SHOULDER: Primary | ICD-10-CM

## 2025-05-06 PROCEDURE — 99999 PR PBB SHADOW E&M-EST. PATIENT-LVL III: CPT | Mod: PBBFAC,,, | Performed by: FAMILY MEDICINE

## 2025-05-06 PROCEDURE — 1159F MED LIST DOCD IN RCRD: CPT | Mod: CPTII,S$GLB,, | Performed by: FAMILY MEDICINE

## 2025-05-06 PROCEDURE — 1160F RVW MEDS BY RX/DR IN RCRD: CPT | Mod: CPTII,S$GLB,, | Performed by: FAMILY MEDICINE

## 2025-05-06 PROCEDURE — 3074F SYST BP LT 130 MM HG: CPT | Mod: CPTII,S$GLB,, | Performed by: FAMILY MEDICINE

## 2025-05-06 PROCEDURE — 3078F DIAST BP <80 MM HG: CPT | Mod: CPTII,S$GLB,, | Performed by: FAMILY MEDICINE

## 2025-05-06 PROCEDURE — 99213 OFFICE O/P EST LOW 20 MIN: CPT | Mod: S$GLB,,, | Performed by: FAMILY MEDICINE

## 2025-05-06 PROCEDURE — 3008F BODY MASS INDEX DOCD: CPT | Mod: CPTII,S$GLB,, | Performed by: FAMILY MEDICINE

## 2025-05-06 RX ORDER — PREDNISONE 50 MG/1
50 TABLET ORAL DAILY
Qty: 10 TABLET | Refills: 0 | Status: SHIPPED | OUTPATIENT
Start: 2025-05-06

## 2025-05-06 RX ORDER — IBUPROFEN 800 MG/1
800 TABLET ORAL
Qty: 90 TABLET | Refills: 1 | Status: SHIPPED | OUTPATIENT
Start: 2025-05-06

## 2025-05-06 RX ORDER — CYCLOBENZAPRINE HCL 10 MG
10 TABLET ORAL NIGHTLY PRN
Qty: 30 TABLET | Refills: 1 | Status: SHIPPED | OUTPATIENT
Start: 2025-05-06

## 2025-05-07 NOTE — PROGRESS NOTES
"Subjective     Patient ID: Chanelle Almazan is a 35 y.o. female.    Chief Complaint: Shoulder Pain (right)    HPI  History of Present Illness    CHIEF COMPLAINT:  Chanelle presents today for right shoulder pain.    MUSCULOSKELETAL - RIGHT SHOULDER PAIN:  She initially experienced right shoulder pain in 2022 while playing on a spacewalk and water slides. The pain recurred 3 weeks ago after pressure washing her house. She reports pain radiating from her shoulder to her elbow and down to her palm, with limited range of motion and inability to lift her arm past a certain point. She experiences a popping sensation in the shoulder described as "bone on bone," along with tightness and possible swelling. She is unable to lay on the affected side. X-ray and steroids in 2022 provided no relief. She currently manages symptoms with ibuprofen, which partially relieves pain from axilla to elbow but does not improve arm mobility, along with heat and ice therapy. She actively attempts to maintain shoulder mobility despite discomfort.    CURRENT MEDICATIONS:  She uses CBD and ibuprofen as needed.      ROS:  General: -fever, -chills, +fatigue, -weight gain, -weight loss  Eyes: -vision changes, -redness, -discharge  ENT: -ear pain, -nasal congestion, -sore throat  Cardiovascular: -chest pain, -palpitations, -lower extremity edema  Respiratory: -cough, -shortness of breath  Gastrointestinal: -abdominal pain, -nausea, -vomiting, -diarrhea, -constipation, -blood in stool  Genitourinary: -dysuria, -hematuria, -frequency  Musculoskeletal: -joint pain, -muscle pain, +limb pain, +pain with movement, +muscle tension, +limb swelling  Skin: -rash, -lesion  Neurological: -headache, -dizziness, -numbness, -tingling  Psychiatric: -anxiety, -depression, -sleep difficulty       Most recent lab results reviewed with patient.        Objective     Vitals:    05/06/25 1539   BP: 118/78   Pulse: 81        Current Medications[1]     Physical Exam    General: " No acute distress. Well-developed. Well-nourished.  Eyes: EOMI. Sclerae anicteric.  HENT: Normocephalic. Atraumatic. Nares patent. Moist oral mucosa.  Ears: Bilateral TMs clear. Bilateral EACs clear.  Cardiovascular: Regular rate. Regular rhythm. No murmurs. No rubs. No gallops. Normal S1, S2.  Respiratory: Normal respiratory effort. Clear to auscultation bilaterally. No rales. No rhonchi. No wheezing.  Abdomen: Soft. Non-tender. Non-distended. Normoactive bowel sounds.  Musculoskeletal: No  obvious deformity.  Extremities: No lower extremity edema.  Neurological: Alert & oriented x3. No slurred speech. Normal gait.  Psychiatric: Normal mood. Normal affect. Good insight. Good judgment.  Skin: Warm. Dry. No rash.  MSK: Shoulder - Right: Limited abduction of the right shoulder joint. Tenderness in the anterior shoulder joint. Tenderness in the posterior shoulder joint. Tenderness along the spine of the scapula.            Assessment and Plan     Acute pain of right shoulder  - Conservative treatment including rest, ice/heat, message, exercise, compression, topical creams, muscle relaxer, and Tylenol/NSAIDS.  Script given for Motrin 800mg every 8 hours prn and Flexeril 10mg at bedtime as needed.  Also given steroid burst of Prednisone 50mg by mouth x10 days.  Should pain not be relieved with conservative treatment will consider imaging, PT and/or referral to Ortho.  -     predniSONE (DELTASONE) 50 MG Tab; Take 1 tablet (50 mg total) by mouth once daily.  Dispense: 10 tablet; Refill: 0  -     cyclobenzaprine (FLEXERIL) 10 MG tablet; Take 1 tablet (10 mg total) by mouth nightly as needed for Muscle spasms.  Dispense: 30 tablet; Refill: 1  -     ibuprofen (ADVIL,MOTRIN) 800 MG tablet; Take 1 tablet (800 mg total) by mouth every meal as needed for Pain.  Dispense: 90 tablet; Refill: 1    Assessment & Plan    M25.511 Pain in right shoulder    IMPRESSION:  - Assessed right shoulder strain, likely due to recent pressure  washing activity.  - Considered possibility of pinched nerve due to pain radiating down arm.  - Ruled out immediate concern for rotator cuff tear due to lack of significant trauma.  - Determined conservative treatment approach with anti-inflammatory medication and muscle relaxants.    PLAN SUMMARY:  - Prescribed prednisone 50 mg daily for 10 days  - Prescribed ibuprofen 800 mg every 8 hours as needed (#60 with 1 refill)  - Prescribed cyclobenzaprine (Flexeril) 10 mg at bedtime as needed for muscle spasms (#30 with 1 refill)  - Recommend OTC Voltaren gel for topical relief  - Advised gentle stretching and movement exercises within pain tolerance  - Advised application of heat or ice to affected area  - Avoid NSAIDs while on prednisone  - Avoid activities that exacerbate pain  - Consider imaging studies if no improvement occurs  - May consider physical therapy, injection, or orthopedic referral if symptoms persist  - Follow up in 4 weeks if symptoms persist or worsen    ACUTE RIGHT SHOULDER PAIN:  - Chanelle reports pain from shoulder to elbow and down to palm, described as 'on fire' and worsening with certain movements, especially after pressure washing 3 weeks ago.  - Physical exam revealed limited abduction of the right shoulder joint, with pain in the posterior shoulder joint and along the spine of the scapula.  - Assessed as a strain rather than rotator cuff tear due to lack of significant trauma.  - Explained the nature of shoulder strains, typical 4-6 week healing process, role of inflammation in pain, and rationale for conservative treatment before imaging or specialist referral.  - Prescribed prednisone 50 mg daily for 10 days to reduce inflammation, followed by ibuprofen 800 mg every 8 hours as needed (#60 with 1 refill).  - Advised to avoid NSAIDs while on prednisone.  - Recommend OTC Voltaren gel for topical relief and application of heat or ice to affected area.  - Chanelle should avoid activities that  exacerbate pain.  - Chanelle reports inability to lift arm past a certain point and difficulty lying on the affected shoulder.  - This stiffness appears related to the shoulder strain.  - Instructed patient to perform gentle stretching and movement exercises to maintain mobility despite discomfort.  - Advised to keep the shoulder moving within pain tolerance to prevent further stiffness.  - Chanelle reports tightness and possible swelling in the shoulder area, with muscle spasm noted during exam, especially when pressure is applied.  - These spasms are likely related to inflammation and possible nerve impingement from the strain.  - Prescribed cyclobenzaprine (Flexeril) 10 mg at bedtime as needed for muscle spasms (#30 with 1 refill).  - Discussed switching to prednisone for better anti-inflammatory effect, followed by higher-dose ibuprofen (800 mg up to 3 times daily) if needed after completing the prednisone course.  - Explained mechanism of anti-inflammatory medications and recommended supplementing with OTC Voltaren gel for topical relief.    FOLLOW-UP:  - Advised to follow up in 4 weeks if symptoms persist or worsen.  - If no improvement occurs, will consider imaging studies and further treatment options including physical therapy, injection, or orthopedic referral.         This note was generated with the assistance of ambient listening technology. Verbal consent was obtained by the patient and accompanying visitor(s) for the recording of patient appointment to facilitate this note. I attest to having reviewed and edited the generated note for accuracy, though some syntax or spelling errors may persist. Please contact the author of this note for any clarification.         [1]   Current Outpatient Medications   Medication Sig Dispense Refill    paroxetine (PAXIL) 20 MG tablet Take 1 tablet (20 mg total) by mouth every morning. 90 tablet 1    cyclobenzaprine (FLEXERIL) 10 MG tablet Take 1 tablet (10 mg total) by  mouth nightly as needed for Muscle spasms. 30 tablet 1    ibuprofen (ADVIL,MOTRIN) 800 MG tablet Take 1 tablet (800 mg total) by mouth every meal as needed for Pain. 90 tablet 1    predniSONE (DELTASONE) 50 MG Tab Take 1 tablet (50 mg total) by mouth once daily. 10 tablet 0     No current facility-administered medications for this visit.

## 2025-05-12 ENCOUNTER — OFFICE VISIT (OUTPATIENT)
Dept: DERMATOLOGY | Facility: CLINIC | Age: 35
End: 2025-05-12
Payer: COMMERCIAL

## 2025-05-12 DIAGNOSIS — L70.0 ACNE VULGARIS: Primary | ICD-10-CM

## 2025-05-12 PROCEDURE — 1159F MED LIST DOCD IN RCRD: CPT | Mod: CPTII,S$GLB,, | Performed by: PHYSICIAN ASSISTANT

## 2025-05-12 PROCEDURE — 99204 OFFICE O/P NEW MOD 45 MIN: CPT | Mod: S$GLB,,, | Performed by: PHYSICIAN ASSISTANT

## 2025-05-12 PROCEDURE — 1160F RVW MEDS BY RX/DR IN RCRD: CPT | Mod: CPTII,S$GLB,, | Performed by: PHYSICIAN ASSISTANT

## 2025-05-12 PROCEDURE — G2211 COMPLEX E/M VISIT ADD ON: HCPCS | Mod: S$GLB,,, | Performed by: PHYSICIAN ASSISTANT

## 2025-05-12 PROCEDURE — 99999 PR PBB SHADOW E&M-EST. PATIENT-LVL III: CPT | Mod: PBBFAC,,, | Performed by: PHYSICIAN ASSISTANT

## 2025-05-12 RX ORDER — SPIRONOLACTONE 50 MG/1
TABLET, FILM COATED ORAL
Qty: 60 TABLET | Refills: 3 | Status: SHIPPED | OUTPATIENT
Start: 2025-05-12

## 2025-05-12 NOTE — PROGRESS NOTES
Subjective:      Patient ID:  Chanelle Almazan is a 35 y.o. female who presents for   Chief Complaint   Patient presents with    Acne     Face      Pt is here for acne on the face and neck. Reports having combination skin and that acne flares with cycle. Flares worse on cheeks.    Pt has used otc treatments and rx creams (Differin, tretinoin, etc.)    Currently uses korean skin care, cerave sal acid wash, dark spot corrector, toner, and sunscreen.    Not on birth control. Reports having tubes tied in 2022.        Review of Systems   Skin:  Negative for itching, rash and dry skin.       Objective:   Physical Exam   Constitutional: She appears well-developed and well-nourished. No distress.   Neurological: She is alert and oriented to person, place, and time. She is not disoriented.   Psychiatric: She has a normal mood and affect.   Skin:   Areas Examined (abnormalities noted in diagram):   Head / Face Inspection Performed  Neck Inspection Performed                      Diagram Legend     Erythematous scaling macule/papule c/w actinic keratosis       Vascular papule c/w angioma      Pigmented verrucoid papule/plaque c/w seborrheic keratosis      Yellow umbilicated papule c/w sebaceous hyperplasia      Irregularly shaped tan macule c/w lentigo     1-2 mm smooth white papules consistent with Milia      Movable subcutaneous cyst with punctum c/w epidermal inclusion cyst      Subcutaneous movable cyst c/w pilar cyst      Firm pink to brown papule c/w dermatofibroma      Pedunculated fleshy papule(s) c/w skin tag(s)      Evenly pigmented macule c/w junctional nevus     Mildly variegated pigmented, slightly irregular-bordered macule c/w mildly atypical nevus      Flesh colored to evenly pigmented papule c/w intradermal nevus       Pink pearly papule/plaque c/w basal cell carcinoma      Erythematous hyperkeratotic cursted plaque c/w SCC      Surgical scar with no sign of skin cancer recurrence      Open and closed comedones       Inflammatory papules and pustules      Verrucoid papule consistent consistent with wart     Erythematous eczematous patches and plaques     Dystrophic onycholytic nail with subungual debris c/w onychomycosis     Umbilicated papule    Erythematous-base heme-crusted tan verrucoid plaque consistent with inflamed seborrheic keratosis     Erythematous Silvery Scaling Plaque c/w Psoriasis     See annotation      Assessment / Plan:        Acne vulgaris  -     HCG, Quantitative; Future; Expected date: 05/12/2025  -     Hepatic Function Panel; Future; Expected date: 05/12/2025  -     Lipid Panel; Future; Expected date: 05/12/2025  -     spironolactone (ALDACTONE) 50 MG tablet; Start with 1 po qday, increase to 2 po qday as tolerated  Dispense: 60 tablet; Refill: 3      Lab Results   Component Value Date    K 3.7 11/18/2024          Discussed benefits and risks of therapy including but not limited to breakthrough bleeding, breast tenderness, and elevated potassium levels which may give symptoms of fatigue, palpitations, and nausea. Patient should limit potassium intake - avoid potassium supplements or salt substitutes, limit bananas and citrus fruits. Pregnancy must be avoided while taking spironolactone.    May consider Accutane in the future if no improvement.     Rec vitamin C in the AM and differin or tretinoin at night to help lighten acne scarring.       Follow up in about 3 months (around 8/12/2025).

## 2025-05-12 NOTE — PATIENT INSTRUCTIONS
Acne vulgaris  -     HCG, Quantitative; Future; Expected date: 05/12/2025  -     Hepatic Function Panel; Future; Expected date: 05/12/2025  -     Lipid Panel; Future; Expected date: 05/12/2025  -     spironolactone (ALDACTONE) 50 MG tablet; Start with 1 po qday, increase to 2 po qday as tolerated  Dispense: 60 tablet; Refill: 3      Lab Results   Component Value Date    K 3.7 11/18/2024          Discussed benefits and risks of therapy including but not limited to breakthrough bleeding, breast tenderness, and elevated potassium levels which may give symptoms of fatigue, palpitations, and nausea. Patient should limit potassium intake - avoid potassium supplements or salt substitutes, limit bananas and citrus fruits. Pregnancy must be avoided while taking spironolactone.    May consider Accutane in the future if no improvement.     Rec vitamin C in the AM and differin or tretinoin at night to help lighten acne scarring.       Follow up in about 3 months (around 8/12/2025).    Accutane  What is it?  An oral vitamin A derivative, also known as isotretinoin that is used to treat severe acne.  Potential side effects  Mucocutaneous  Cheilitis, dry skin and mucous membranes, epistaxis, desquamation, photosensitivity, and pruritus  Acne may worsen in the beginning of treatment  Teratogenicity  Causes severe, life-threatening congenital malformation and spontaneous abortions  Fetal abnormalities have not been attributed to the use of isotretinoin in men  Psychiatric effects  The relationship between isotretinoin therapy and depression and suicide in uncertain. Patients should be advised of the possible link and should be monitored for the development of depression or suicidal ideation.   Inflammatory bowel disease  Although an increased risk for IBD has been reported, a relationship between these disorders has not been proven.  Hyperlipidemia  Common, occurring in up to 45 percent of patients taking isotretinoin  Other side  effects  Myalgias (particularly in patient who engage in vigorous physical activity), visual changes (decreased night vision and corneal opacities), hepatotoxicity, bone marrow suppression, idiopathic intracranial hypertension (pseudotumor cerebri- must tell provider if you feel you are having the worst headache of your life), and bone changes   Contraindications and Precautions  Should not be given to pregnant individuals or individuals who could become pregnant during or within one month after therapy. Effective measures to prevent pregnancy are indicated for all individuals who could become pregnant.   Individuals with a history of hypersensitive to isotretinoin or any of its components should not receive it.  Isotretinoin capsules contain soybean oil and should be used with caution in patients with severe soy allergy  Drug interactions   Avoid taking with tetracyclines as they both share the potential side effect of idiopathic intracranial hypertension   Vitamin A supplementation may increase the adverse effects of isotretinoin and should be avoided  Children  Can't use in children under 12 years of age  Cutaneous procedures  Delaying cutaneous procedures until at least 6 months after the completion of oral isotretinoin therapy has been historically practice due to concern for increased risks for abnormal scarring and delayed wound healing   Microdermabrasion, laser hair removal, waxing, peels  Alcohol use  Avoid excess alcohol consumption because excessive alcohol consumption can lead to hypertriglyceridemia and liver enzyme elevations  iPledge  Risk management program that was created with the goal of eliminating fetal exposure to isotretinoin  All patients who can get pregnant must commit to the use of two forms of birth control for at least one month prior to starting isotretinoin therapy, during therapy, and for one month after therapy.  All patient who can frank pregnant must have two negative urine or blood  pregnancy tests before receiving the initial prescription. The second pregnancy test must be conducted in a CLIA-certified laboratory. For each month of therapy and one month after completing therapy, patients must return to their clinicians for evaluation, counseling, education, and a pregnancy test conducted by a CLIA-certified laboratory.   Clinicians must document on a monthly basis the pregnancy test result and the patient's two forms of birth control and confirm that the patient has received the appropriate counseling.   For patients who do not have childbearing potential, it must be documented that they were counseled on the program requirements.   Other stipulations: cannot share medicine or donate blood while on Accutane  Laboratory monitoring   Monthly pregnancy tests (the first two have to be in an actual lab, but after that, at-home pregnancy tests can be done with the test on a white sheet of paper with their name and  on it)  Baseline ALT and triglyceride levels; if these were normal, then they should be repeated one month after starting the peak dose, which is usually after completion of a total of two months of isotretinoin therapy   If the results of testing after reaching the peak dose are normal and the dose of isotretinoin is not increased, then further monitoring is not required.   If the results of testing are abnormal or if the patient has a known lipid abnormality, then periodic monitoring should be continued.  Development of new symptoms (abdominal pain), the initiation of new medications or supplements, or an increase in the dose of isotretinoin way warrant retesting   Stopping therapy/follow up  Usually discontinued after achievement of the goal cumulative dose of 120 to 150 mg/kg and acne clearance (two months after clearance); the drug can be stopped abruptly. Tapering is not necessary.  Individuals of child-bearing potential require one final pregnancy test one month after completion  of isotretinoin   Random facts  Most people only need one course of treatment.   Most relapses occurred within the first 18 months after treatment. Patients with truncal acne relapsed more frequently than patients with predominantly facial acne.

## 2025-05-13 ENCOUNTER — OFFICE VISIT (OUTPATIENT)
Dept: DERMATOLOGY | Facility: CLINIC | Age: 35
End: 2025-05-13
Payer: COMMERCIAL

## 2025-05-13 ENCOUNTER — PATIENT MESSAGE (OUTPATIENT)
Dept: DERMATOLOGY | Facility: CLINIC | Age: 35
End: 2025-05-13

## 2025-05-13 DIAGNOSIS — Z79.899 LONG-TERM USE OF HIGH-RISK MEDICATION: ICD-10-CM

## 2025-05-13 DIAGNOSIS — L70.0 ACNE VULGARIS: Primary | ICD-10-CM

## 2025-05-13 PROCEDURE — 98006 SYNCH AUDIO-VIDEO EST MOD 30: CPT | Mod: 95,,, | Performed by: PHYSICIAN ASSISTANT

## 2025-05-13 PROCEDURE — 1159F MED LIST DOCD IN RCRD: CPT | Mod: CPTII,95,, | Performed by: PHYSICIAN ASSISTANT

## 2025-05-13 PROCEDURE — G2211 COMPLEX E/M VISIT ADD ON: HCPCS | Mod: 95,,, | Performed by: PHYSICIAN ASSISTANT

## 2025-05-13 PROCEDURE — 1160F RVW MEDS BY RX/DR IN RCRD: CPT | Mod: CPTII,95,, | Performed by: PHYSICIAN ASSISTANT

## 2025-05-13 NOTE — PROGRESS NOTES
Patient Information  Name: Chanelle Almazan  : 1990  MRN: [id number]     Referring Physician:  Dr. Pappas ref. provider found   Primary Care Physician:  Leslie Nicolas MD   Date of Visit: 2025      Subjective:       Chanelle Almazan is a 35 y.o. female who presents for       HPI    Pt was recently seen for acne and started on spironolactone but she would like to pursue Accutane treatment instead.  Currently has depression and is on Paxel. Doesn't have a psychiatrist, but is managed by her PCP.  Denies sucidial ideations or attempts. Denies family hx of suicide. Mom has depression. Denies personal history of IBD.       Denies having back acne.     Patient was last seen in Dermatology: 2025.    Prior notes by myself reviewed.       Review of Systems   Skin:  Negative for itching and rash.   Psychiatric/Behavioral:  Positive for depressed mood.         Objective:    Physical Exam   Constitutional: She appears well-developed and well-nourished. No distress.   Neurological: She is alert and oriented to person, place, and time. She is not disoriented.   Psychiatric: She has a normal mood and affect.   Skin:   Areas Examined (abnormalities noted in diagram):   Head / Face Inspection Performed  Neck Inspection Performed              Diagram Legend     Erythematous scaling macule/papule c/w actinic keratosis       Vascular papule c/w angioma      Pigmented verrucoid papule/plaque c/w seborrheic keratosis      Yellow umbilicated papule c/w sebaceous hyperplasia      Irregularly shaped tan macule c/w lentigo     1-2 mm smooth white papules consistent with Milia      Movable subcutaneous cyst with punctum c/w epidermal inclusion cyst      Subcutaneous movable cyst c/w pilar cyst      Firm pink to brown papule c/w dermatofibroma      Pedunculated fleshy papule(s) c/w skin tag(s)      Evenly pigmented macule c/w junctional nevus     Mildly variegated pigmented, slightly irregular-bordered macule c/w mildly  atypical nevus      Flesh colored to evenly pigmented papule c/w intradermal nevus       Pink pearly papule/plaque c/w basal cell carcinoma      Erythematous hyperkeratotic cursted plaque c/w SCC      Surgical scar with no sign of skin cancer recurrence      Open and closed comedones      Inflammatory papules and pustules      Verrucoid papule consistent consistent with wart     Erythematous eczematous patches and plaques     Dystrophic onycholytic nail with subungual debris c/w onychomycosis     Umbilicated papule    Erythematous-base heme-crusted tan verrucoid plaque consistent with inflamed seborrheic keratosis     Erythematous Silvery Scaling Plaque c/w Psoriasis     See annotation          [] Data reviewed    [] Prior external notes reviewed    [] Independent review of test    [] Management discussed with another provider    [] Independent historian    Assessment / Plan:        Acne vulgaris  -     Pregnancy, urine rapid; Future    Long-term use of high-risk medication  -     Pregnancy, urine rapid; Future    Acne vulgaris  Elected to start isotretinoin due to severe nodulocystic acne with scarring. Prior patient tried/failed: OTC treatments, OCP, differin, tretinoin     We set expectations that isotretinoin cures acne in about 60% of patients. In some patients it recurs or requires another course later in life. Acne may worsen first 1-2 months of treatment before improving. We reviewed that accutane requires 5+ months of treatment and monthly office visit during treatment. For female patients, reviewed if they do not make office visit or  prescription in window (7 days) they will get locked out of iPledge and be unable to get medication that month.    Discussed risks and benefits of isotretinoin including but not limited to dry eyes, dry skin, and dry lips; headaches; nosebleeds; muscle aches; joint aches; elevated liver functions; elevated cholesterol or triglycerides; mood changes, depression or  suicidal thoughts; increased sun sensitivity; birth defects.     No donating blood while on isotretinoin or for one month after completion of isotretinoin course. No sharing the isotretinoin with other people.      For female patients  Patient counseled extensively regarding need for two forms of birth control while on Isotretinoin and for 1 month prior to and 1 month following treatment course.   2 forms of contraception:   1) Tubal sterilization/salpingectomy (2022)   2) Male condoms   Urine pregnancy test performed today and negative    Patient signed consent forms today.  Patient confirmed today in iPledge.    Labs reviewed:    Lab Results   Component Value Date    ALT 7 (L) 05/13/2025    AST 12 05/13/2025    ALKPHOS 63 05/13/2025    BILITOT 0.3 05/13/2025     Lab Results   Component Value Date    CHOL 203 (H) 05/13/2025    CHOL 179 11/18/2024      Lab Results   Component Value Date    HDL 62 05/13/2025    HDL 57 11/18/2024     Lab Results   Component Value Date    LDLCALC 125.2 05/13/2025    LDLCALC 104.0 11/18/2024      Lab Results   Component Value Date    TRIG 79 05/13/2025    TRIG 90 11/18/2024     Lab Results   Component Value Date    TOTALCHOLEST 3.3 05/13/2025    TOTALCHOLEST 3.1 11/18/2024     Lab Results   Component Value Date    NONHDLCHOL 141 05/13/2025    NONHDLCHOL 122 11/18/2024     Lab Results   Component Value Date    CHOLHDL 30.5 05/13/2025    CHOLHDL 31.8 11/18/2024         Plan:  - Will check labs (lipid levels and LFTs) again at the end of second month of accutane treatment  -Plan for lab pregnancy test in about one month  - Will plan to start Isotretinoin at 50 mg po daily     RTC in about two months (after first month of accutane)           The patient location is: [place]  The chief complaint leading to consultation is: acne    Visit type: audiovisual    Face to Face time with patient: 21 minutes  21 minutes of total time spent on the encounter, which includes face to face time and non-face to  face time preparing to see the patient (eg, review of tests), Obtaining and/or reviewing separately obtained history, Documenting clinical information in the electronic or other health record, Independently interpreting results (not separately reported) and communicating results to the patient/family/caregiver, or Care coordination (not separately reported).         Each patient to whom he or she provides medical services by telemedicine is:  (1) informed of the relationship between the physician and patient and the respective role of any other health care provider with respect to management of the patient; and (2) notified that he or she may decline to receive medical services by telemedicine and may withdraw from such care at any time.          LOS NUMBER AND COMPLEXITY OF PROBLEMS    COMPLEXITY OF DATA RISK TOTAL TIME (m)   63872  [id number] [] 1 self-limited or minor problem [] Minimal to none [] No treatment recommended or patient to monitor. Reassurance.  15-29  10-19   [id number]  [id number] Low  [] 2 or more self limited or minor problems  [] 1 stable chronic illness  [] 1 acute, uncomplicated illness or injury Limited (2)  [] Prior external notes from each unique source  [] Review result of each unique test  [] Order each unique test  OR [] Independent historian Low  []  OTC medications   []  Discussed/Decision for minor skin surgery (no risk factors) 30-44 20-29   [id number]  39206 Moderate  []  1 or more chronic unstable illness (not at goal or progression or exacerbation) or SE of treatment  []  2 or more stable chronic illnesses  []  1 acute illness with systemic symptoms  []  1 acute complicated injury  []  1 undiagnosed new problem with uncertain prognosis Moderate (1/3 below)  []  3 or more data items        *Now includes independent historian  []  Independent interpretation of a test  []  Discuss management/test with another provider Moderate  []  Prescription drug mgmt  []  Discussed/Decision for Minor surgery with  risk factors  []  Mgmt limited by social determinates 45-59  30-39   39763  59295 High  []  1 or more chronic illness with severe exacerbation, progression or SE of treatment  []  1 acute or chronic illness/injury that poses a threat to life or bodily function Extensive (2/3 below)  []  3 or more data items        *Now includes independent historian.  []  Independent interpretation of a test  []  Discuss management/test with another provider High  []  Major surgery with risk discussed  []  Drug therapy requiring intensive monitoring for toxicity  []  Hospitalization  []  Decision for DNR 60-74  40-54

## 2025-05-13 NOTE — PATIENT INSTRUCTIONS
Acne vulgaris    Long-term use of high-risk medication    Acne vulgaris  Elected to start isotretinoin due to severe nodulocystic acne with scarring. Prior patient tried/failed: OTC treatments, OCP, differin, tretinoin     We set expectations that isotretinoin cures acne in about 60% of patients. In some patients it recurs or requires another course later in life. Acne may worsen first 1-2 months of treatment before improving. We reviewed that accutane requires 5+ months of treatment and monthly office visit during treatment. For female patients, reviewed if they do not make office visit or  prescription in window (7 days) they will get locked out of iPledge and be unable to get medication that month.    Discussed risks and benefits of isotretinoin including but not limited to dry eyes, dry skin, and dry lips; headaches; nosebleeds; muscle aches; joint aches; elevated liver functions; elevated cholesterol or triglycerides; mood changes, depression or suicidal thoughts; increased sun sensitivity; birth defects.     No donating blood while on isotretinoin or for one month after completion of isotretinoin course. No sharing the isotretinoin with other people.      For female patients  Patient counseled extensively regarding need for two forms of birth control while on Isotretinoin and for 1 month prior to and 1 month following treatment course.   2 forms of contraception:   1) Tubal sterilization/salpingectomy (2022)   2) Male condoms   Urine pregnancy test performed today and negative    Patient signed consent forms today.  Patient confirmed today in [employer] [employer].    Labs reviewed:    Lab Results   Component Value Date    ALT 7 (L) 05/13/2025    AST 12 05/13/2025    ALKPHOS 63 05/13/2025    BILITOT 0.3 05/13/2025     Lab Results   Component Value Date    CHOL 203 (H) 05/13/2025    CHOL 179 11/18/2024      Lab Results   Component Value Date    HDL 62 05/13/2025    HDL 57 11/18/2024     Lab Results   Component Value  Date    LDLCALC 125.2 05/13/2025    LDLCALC 104.0 11/18/2024      Lab Results   Component Value Date    TRIG 79 05/13/2025    TRIG 90 11/18/2024     Lab Results   Component Value Date    TOTALCHOLEST 3.3 05/13/2025    TOTALCHOLEST 3.1 11/18/2024     Lab Results   Component Value Date    NONHDLCHOL 141 05/13/2025    NONHDLCHOL 122 11/18/2024     Lab Results   Component Value Date    CHOLHDL 30.5 05/13/2025    CHOLHDL 31.8 11/18/2024         Plan:  - Will check labs (lipid levels and LFTs) again at the end of second month of accutane treatment  -Plan for lab pregnancy test in about one month  - Will plan to start Isotretinoin at 50 mg po daily     RTC in about two months (after first month of accutane)

## 2025-06-12 ENCOUNTER — RESULTS FOLLOW-UP (OUTPATIENT)
Dept: DERMATOLOGY | Facility: CLINIC | Age: 35
End: 2025-06-12
Payer: COMMERCIAL

## 2025-06-12 DIAGNOSIS — L70.0 ACNE VULGARIS: Primary | ICD-10-CM

## 2025-06-12 DIAGNOSIS — Z79.899 LONG-TERM USE OF HIGH-RISK MEDICATION: ICD-10-CM

## 2025-06-12 PROCEDURE — 99999 PR PBB SHADOW E&M-EST. PATIENT-LVL I: CPT | Mod: PBBFAC,,, | Performed by: PHYSICIAN ASSISTANT

## 2025-06-12 NOTE — PROGRESS NOTES
Negative pregnancy test. Plan for Accutane 30 mg daily for the first month and then 60 mg daily the second month and thereafter as long as she is tolerating it well. Reach out to schedule follow up in 33 days or so. -MARYAM TORRES

## 2025-06-13 RX ORDER — ISOTRETINOIN 30 MG/1
CAPSULE, GELATIN COATED ORAL
Qty: 30 CAPSULE | Refills: 0 | Status: SHIPPED | OUTPATIENT
Start: 2025-06-13

## 2025-07-11 DIAGNOSIS — Z79.899 LONG-TERM USE OF HIGH-RISK MEDICATION: ICD-10-CM

## 2025-07-11 DIAGNOSIS — L70.0 ACNE VULGARIS: ICD-10-CM

## 2025-07-17 ENCOUNTER — PATIENT MESSAGE (OUTPATIENT)
Dept: DERMATOLOGY | Facility: CLINIC | Age: 35
End: 2025-07-17

## 2025-07-17 ENCOUNTER — OFFICE VISIT (OUTPATIENT)
Dept: DERMATOLOGY | Facility: CLINIC | Age: 35
End: 2025-07-17
Payer: COMMERCIAL

## 2025-07-17 DIAGNOSIS — Z79.899 LONG-TERM USE OF HIGH-RISK MEDICATION: ICD-10-CM

## 2025-07-17 DIAGNOSIS — L70.0 ACNE VULGARIS: Primary | ICD-10-CM

## 2025-07-17 PROCEDURE — G2211 COMPLEX E/M VISIT ADD ON: HCPCS | Mod: 95,,, | Performed by: PHYSICIAN ASSISTANT

## 2025-07-17 PROCEDURE — 1160F RVW MEDS BY RX/DR IN RCRD: CPT | Mod: CPTII,95,, | Performed by: PHYSICIAN ASSISTANT

## 2025-07-17 PROCEDURE — 1159F MED LIST DOCD IN RCRD: CPT | Mod: CPTII,95,, | Performed by: PHYSICIAN ASSISTANT

## 2025-07-17 PROCEDURE — 98006 SYNCH AUDIO-VIDEO EST MOD 30: CPT | Mod: 95,,, | Performed by: PHYSICIAN ASSISTANT

## 2025-07-17 RX ORDER — ISOTRETINOIN 30 MG/1
CAPSULE, GELATIN COATED ORAL
Qty: 60 CAPSULE | Refills: 0 | Status: SHIPPED | OUTPATIENT
Start: 2025-07-17

## 2025-07-17 RX ORDER — ISOTRETINOIN 30 MG/1
CAPSULE, GELATIN COATED ORAL
Qty: 30 CAPSULE | Refills: 0 | OUTPATIENT
Start: 2025-07-17

## 2025-07-17 NOTE — PROGRESS NOTES
Patient Information  Name: Chanelle Almazan  : 1990  MRN: [id number]     Referring Physician:  Dr. Pappas ref. provider found   Primary Care Physician:  Leslie Nicolas MD   Date of Visit: 2025      Subjective:       Chanelle Almazan is a 35 y.o. female who presents for accutane follow up.       Acne - Follow-up  Symptom course: stable  Currently using: Post first month: Accutane 30 mg daily, OTC moisturizers.  Signs / symptoms: dryness and cracking      Patient was last seen in Dermatology: 2025.    Prior notes by myself reviewed.       Review of Systems   HENT:  Negative for nosebleeds and headaches.    Gastrointestinal:  Negative for nausea, vomiting, abdominal pain and diarrhea.   Musculoskeletal:  Negative for myalgias and arthralgias.   Skin:  Positive for dry skin, daily sunscreen use and dry lips. Negative for itching, rash, sun sensitivity, activity-related sunscreen use and recent sunburn.   Neurological:  Negative for headaches.   Psychiatric/Behavioral:  Negative for depressed mood.         Objective:    Physical Exam   Constitutional: She appears well-developed and well-nourished. No distress.   Neurological: She is alert and oriented to person, place, and time. She is not disoriented.   Psychiatric: She has a normal mood and affect.   Skin:   Areas Examined (abnormalities noted in diagram):   Head / Face Inspection Performed              Diagram Legend     Erythematous scaling macule/papule c/w actinic keratosis       Vascular papule c/w angioma      Pigmented verrucoid papule/plaque c/w seborrheic keratosis      Yellow umbilicated papule c/w sebaceous hyperplasia      Irregularly shaped tan macule c/w lentigo     1-2 mm smooth white papules consistent with Milia      Movable subcutaneous cyst with punctum c/w epidermal inclusion cyst      Subcutaneous movable cyst c/w pilar cyst      Firm pink to brown papule c/w dermatofibroma      Pedunculated fleshy papule(s) c/w skin tag(s)       Evenly pigmented macule c/w junctional nevus     Mildly variegated pigmented, slightly irregular-bordered macule c/w mildly atypical nevus      Flesh colored to evenly pigmented papule c/w intradermal nevus       Pink pearly papule/plaque c/w basal cell carcinoma      Erythematous hyperkeratotic cursted plaque c/w SCC      Surgical scar with no sign of skin cancer recurrence      Open and closed comedones      Inflammatory papules and pustules      Verrucoid papule consistent consistent with wart     Erythematous eczematous patches and plaques     Dystrophic onycholytic nail with subungual debris c/w onychomycosis     Umbilicated papule    Erythematous-base heme-crusted tan verrucoid plaque consistent with inflamed seborrheic keratosis     Erythematous Silvery Scaling Plaque c/w Psoriasis     See annotation              [] Data reviewed    [] Prior external notes reviewed    [] Independent review of test    [] Management discussed with another provider    [] Independent historian    Assessment / Plan:        Acne vulgaris  -     HCG, Quantitative; Future; Expected date: 08/17/2025  -     Lipid Panel; Future; Expected date: 08/17/2025  -     Hepatic Function Panel; Future; Expected date: 08/17/2025  -     ACCUTANE 30 mg capsule; Take 2 capsules po qday with food  Dispense: 60 capsule; Refill: 0    Long-term use of high-risk medication  -     HCG, Quantitative; Future; Expected date: 08/17/2025  -     Lipid Panel; Future; Expected date: 08/17/2025  -     Hepatic Function Panel; Future; Expected date: 08/17/2025  -     ACCUTANE 30 mg capsule; Take 2 capsules po qday with food  Dispense: 60 capsule; Refill: 0         Elected to continue isotretinoin due to severe nodulocystic acne with scarring. Prior patient tried/failed: OTC treatments, OCP, differin, tretinoin      We set expectations that isotretinoin cures acne in about 60% of patients. In some patients it recurs or requires another course later in life. Acne may  worsen first 1-2 months of treatment before improving. We reviewed that accutane requires 5+ months of treatment and monthly office visit during treatment. For female patients, reviewed if they do not make office visit or  prescription in window (7 days) they will get locked out of [employer] and be unable to get medication that month.     Discussed risks and benefits of isotretinoin including but not limited to dry eyes, dry skin, and dry lips; headaches; nosebleeds; muscle aches; joint aches; elevated liver functions; elevated cholesterol or triglycerides; mood changes, depression or suicidal thoughts; increased sun sensitivity; birth defects.      No donating blood while on isotretinoin or for one month after completion of isotretinoin course. No sharing the isotretinoin with other people.       For female patients  Patient counseled extensively regarding need for two forms of birth control while on Isotretinoin and for 1 month prior to and 1 month following treatment course.   2 forms of contraception:   1) Tubal sterilization/salpingectomy (2022)   2) Male condoms   Urine pregnancy test performed today and negative       Patient confirmed today in [employer].             Plan:  - Will check labs (lipid levels and LFTs) again at the end of second month of accutane treatment  -Plan for lab pregnancy test in about one month  - Will increase to Accutane 60 mg daily   -Continue SPF and moisturizers!    RTC in 30-33 days    The patient location is: [place]  The chief complaint leading to consultation is: acne    Visit type: audiovisual    Face to Face time with patient: 6 minutes  6 minutes of total time spent on the encounter, which includes face to face time and non-face to face time preparing to see the patient (eg, review of tests), Obtaining and/or reviewing separately obtained history, Documenting clinical information in the electronic or other health record, Independently interpreting results (not separately  reported) and communicating results to the patient/family/caregiver, or Care coordination (not separately reported).         Each patient to whom he or she provides medical services by telemedicine is:  (1) informed of the relationship between the physician and patient and the respective role of any other health care provider with respect to management of the patient; and (2) notified that he or she may decline to receive medical services by telemedicine and may withdraw from such care at any time.          LOS NUMBER AND COMPLEXITY OF PROBLEMS    COMPLEXITY OF DATA RISK TOTAL TIME (m)   70394  [id number] [] 1 self-limited or minor problem [] Minimal to none [] No treatment recommended or patient to monitor. Reassurance.  15-29  10-19   [id number]  [id number] Low  [] 2 or more self limited or minor problems  [] 1 stable chronic illness  [] 1 acute, uncomplicated illness or injury Limited (2)  [] Prior external notes from each unique source  [] Review result of each unique test  [] Order each unique test  OR [] Independent historian Low  []  OTC medications   []  Discussed/Decision for minor skin surgery (no risk factors) 30-44  20-29   [id number]  [id number] Moderate  []  1 or more chronic unstable illness (not at goal or progression or exacerbation) or SE of treatment  []  2 or more stable chronic illnesses  []  1 acute illness with systemic symptoms  []  1 acute complicated injury  []  1 undiagnosed new problem with uncertain prognosis Moderate (1/3 below)  []  3 or more data items        *Now includes independent historian  []  Independent interpretation of a test  []  Discuss management/test with another provider Moderate  []  Prescription drug mgmt  []  Discussed/Decision for Minor surgery with risk factors  []  Mgmt limited by social determinates 45-59  30-39   04692  [id number] High  []  1 or more chronic illness with severe exacerbation, progression or SE of treatment  []  1 acute or chronic illness/injury that poses a threat to life or  bodily function Extensive (2/3 below)  []  3 or more data items        *Now includes independent historian.  []  Independent interpretation of a test  []  Discuss management/test with another provider High  []  Major surgery with risk discussed  []  Drug therapy requiring intensive monitoring for toxicity  []  Hospitalization  []  Decision for DNR 60-74  40-54

## 2025-07-17 NOTE — PATIENT INSTRUCTIONS
Acne vulgaris  -     HCG, Quantitative; Future; Expected date: 08/17/2025  -     Lipid Panel; Future; Expected date: 08/17/2025  -     Hepatic Function Panel; Future; Expected date: 08/17/2025    Long-term use of high-risk medication  -     HCG, Quantitative; Future; Expected date: 08/17/2025  -     Lipid Panel; Future; Expected date: 08/17/2025  -     Hepatic Function Panel; Future; Expected date: 08/17/2025         Elected to continue isotretinoin due to severe nodulocystic acne with scarring. Prior patient tried/failed: OTC treatments, OCP, differin, tretinoin      We set expectations that isotretinoin cures acne in about 60% of patients. In some patients it recurs or requires another course later in life. Acne may worsen first 1-2 months of treatment before improving. We reviewed that accutane requires 5+ months of treatment and monthly office visit during treatment. For female patients, reviewed if they do not make office visit or  prescription in window (7 days) they will get locked out of [employer] and be unable to get medication that month.     Discussed risks and benefits of isotretinoin including but not limited to dry eyes, dry skin, and dry lips; headaches; nosebleeds; muscle aches; joint aches; elevated liver functions; elevated cholesterol or triglycerides; mood changes, depression or suicidal thoughts; increased sun sensitivity; birth defects.      No donating blood while on isotretinoin or for one month after completion of isotretinoin course. No sharing the isotretinoin with other people.       For female patients  Patient counseled extensively regarding need for two forms of birth control while on Isotretinoin and for 1 month prior to and 1 month following treatment course.   2 forms of contraception:   1) Tubal sterilization/salpingectomy (2022)   2) Male condoms   Urine pregnancy test performed today and negative       Patient confirmed today in [employer].             Plan:  - Will check  labs (lipid levels and LFTs) again at the end of second month of accutane treatment  -Plan for lab pregnancy test in about one month  - Will increase to Accutane 60 mg daily   -Continue SPF and moisturizers!    RTC in 30-33 days

## 2025-08-07 ENCOUNTER — TELEPHONE (OUTPATIENT)
Dept: DERMATOLOGY | Facility: CLINIC | Age: 35
End: 2025-08-07
Payer: COMMERCIAL

## 2025-08-12 ENCOUNTER — OFFICE VISIT (OUTPATIENT)
Dept: DERMATOLOGY | Facility: CLINIC | Age: 35
End: 2025-08-12
Payer: COMMERCIAL

## 2025-08-12 DIAGNOSIS — L70.0 ACNE VULGARIS: Primary | ICD-10-CM

## 2025-08-12 DIAGNOSIS — Z79.899 LONG-TERM USE OF HIGH-RISK MEDICATION: ICD-10-CM

## 2025-08-12 PROCEDURE — 99999 PR PBB SHADOW E&M-EST. PATIENT-LVL III: CPT | Mod: PBBFAC,,, | Performed by: PHYSICIAN ASSISTANT

## 2025-08-12 PROCEDURE — 1159F MED LIST DOCD IN RCRD: CPT | Mod: CPTII,S$GLB,, | Performed by: PHYSICIAN ASSISTANT

## 2025-08-12 PROCEDURE — G2211 COMPLEX E/M VISIT ADD ON: HCPCS | Mod: S$GLB,,, | Performed by: PHYSICIAN ASSISTANT

## 2025-08-12 PROCEDURE — 99214 OFFICE O/P EST MOD 30 MIN: CPT | Mod: S$GLB,,, | Performed by: PHYSICIAN ASSISTANT

## 2025-08-12 RX ORDER — ISOTRETINOIN 30 MG/1
CAPSULE, GELATIN COATED ORAL
Qty: 60 CAPSULE | Refills: 0 | Status: CANCELLED | OUTPATIENT
Start: 2025-08-12

## 2025-08-15 ENCOUNTER — TELEPHONE (OUTPATIENT)
Dept: DERMATOLOGY | Facility: CLINIC | Age: 35
End: 2025-08-15
Payer: COMMERCIAL

## 2025-08-15 DIAGNOSIS — Z79.899 LONG-TERM USE OF HIGH-RISK MEDICATION: ICD-10-CM

## 2025-08-15 DIAGNOSIS — L70.0 ACNE VULGARIS: ICD-10-CM

## 2025-08-15 RX ORDER — ISOTRETINOIN 30 MG/1
CAPSULE ORAL
Qty: 60 CAPSULE | Refills: 0 | Status: SHIPPED | OUTPATIENT
Start: 2025-08-15

## 2025-08-21 ENCOUNTER — OFFICE VISIT (OUTPATIENT)
Dept: FAMILY MEDICINE | Facility: CLINIC | Age: 35
End: 2025-08-21
Payer: COMMERCIAL

## 2025-08-21 VITALS
DIASTOLIC BLOOD PRESSURE: 70 MMHG | HEART RATE: 73 BPM | WEIGHT: 139.56 LBS | SYSTOLIC BLOOD PRESSURE: 122 MMHG | BODY MASS INDEX: 25.68 KG/M2 | HEIGHT: 62 IN | OXYGEN SATURATION: 98 %

## 2025-08-21 DIAGNOSIS — E78.5 HYPERLIPIDEMIA, UNSPECIFIED HYPERLIPIDEMIA TYPE: ICD-10-CM

## 2025-08-21 DIAGNOSIS — F90.2 ATTENTION DEFICIT HYPERACTIVITY DISORDER (ADHD), COMBINED TYPE: ICD-10-CM

## 2025-08-21 DIAGNOSIS — F32.1 MODERATE MAJOR DEPRESSION, SINGLE EPISODE: Primary | ICD-10-CM

## 2025-08-21 DIAGNOSIS — Z00.00 HEALTHCARE MAINTENANCE: ICD-10-CM

## 2025-08-21 PROBLEM — Z30.09 UNWANTED FERTILITY: Status: RESOLVED | Noted: 2022-02-01 | Resolved: 2025-08-21

## 2025-08-21 PROCEDURE — 99999 PR PBB SHADOW E&M-EST. PATIENT-LVL III: CPT | Mod: PBBFAC,,, | Performed by: FAMILY MEDICINE

## 2025-08-21 PROCEDURE — 3078F DIAST BP <80 MM HG: CPT | Mod: CPTII,S$GLB,, | Performed by: FAMILY MEDICINE

## 2025-08-21 PROCEDURE — 1160F RVW MEDS BY RX/DR IN RCRD: CPT | Mod: CPTII,S$GLB,, | Performed by: FAMILY MEDICINE

## 2025-08-21 PROCEDURE — 3008F BODY MASS INDEX DOCD: CPT | Mod: CPTII,S$GLB,, | Performed by: FAMILY MEDICINE

## 2025-08-21 PROCEDURE — 99214 OFFICE O/P EST MOD 30 MIN: CPT | Mod: S$GLB,,, | Performed by: FAMILY MEDICINE

## 2025-08-21 PROCEDURE — 3074F SYST BP LT 130 MM HG: CPT | Mod: CPTII,S$GLB,, | Performed by: FAMILY MEDICINE

## 2025-08-21 PROCEDURE — 1159F MED LIST DOCD IN RCRD: CPT | Mod: CPTII,S$GLB,, | Performed by: FAMILY MEDICINE

## 2025-08-21 RX ORDER — ATOMOXETINE 40 MG/1
40 CAPSULE ORAL DAILY
Qty: 30 CAPSULE | Refills: 1 | Status: SHIPPED | OUTPATIENT
Start: 2025-08-21

## 2025-08-21 RX ORDER — PAROXETINE 40 MG/1
40 TABLET, FILM COATED ORAL EVERY MORNING
Qty: 90 TABLET | Refills: 1 | Status: SHIPPED | OUTPATIENT
Start: 2025-08-21

## (undated) DEVICE — TUBING INSUFFLATION 10

## (undated) DEVICE — APPLICATOR CHLORAPREP ORN 26ML

## (undated) DEVICE — CONTAINER MULTIPURP W/LID 16OZ

## (undated) DEVICE — GLOVE BIOGEL SKINSENSE PI 7.0

## (undated) DEVICE — BANDAGE SOFFORM STER 2IN

## (undated) DEVICE — TRAY FOLEY 16FR INFECTION CONT

## (undated) DEVICE — ELECTRODE REM PLYHSV RETURN 9

## (undated) DEVICE — SEALER LIGASURE LAP 37CM 5MM

## (undated) DEVICE — SEE MEDLINE ITEM 154981

## (undated) DEVICE — SYR B-D DISP CONTROL 10CC100/C

## (undated) DEVICE — SEE MEDLINE ITEM 157131

## (undated) DEVICE — SEE MEDLINE ITEM 156955

## (undated) DEVICE — BLADE SURG CARBON STEEL SZ11

## (undated) DEVICE — DRESSING LEUKOPLAST FLEX 1X3IN

## (undated) DEVICE — COVER OVERHEAD SURG LT BLUE

## (undated) DEVICE — BANDAGE ADHESIVE PLAS STRL 1X3

## (undated) DEVICE — NDL HYPO REG 25G X 1 1/2

## (undated) DEVICE — SEE MEDLINE ITEM 157117

## (undated) DEVICE — PANTIES FEMININE NAPKIN LG/XLG

## (undated) DEVICE — PACK BASIC

## (undated) DEVICE — GAUZE SPONGE 4X4 12PLY

## (undated) DEVICE — DRAPE LAVH LAPAROSCOPY W/FLUID

## (undated) DEVICE — DRAPE STERI-DRAPE 1000 17X11IN

## (undated) DEVICE — KIT ANTIFOG

## (undated) DEVICE — MANIFOLD 4 PORT

## (undated) DEVICE — GLOVE BIOGEL PI MICRO INDIC 7

## (undated) DEVICE — SPONGE DERMA 8PLY 2X2

## (undated) DEVICE — TROCAR ENDOPATH XCEL 5X75MM

## (undated) DEVICE — PADDING CAST SPECIALIST 3X4YD

## (undated) DEVICE — PAD PREP 50/CA

## (undated) DEVICE — SYR 10CC LUER LOCK

## (undated) DEVICE — PAD CNTOUR SUP-ABSRB POSTPRTM

## (undated) DEVICE — TOURNIQUET SB QC DP 18X4IN

## (undated) DEVICE — SUT CTD VICRYL 4-0 P-3 18IN

## (undated) DEVICE — GLOVE SURGICAL LATEX SZ 6.5

## (undated) DEVICE — CLOSURE SKIN STERI STRIP 1/2X4

## (undated) DEVICE — SEE MEDLINE ITEM 157116

## (undated) DEVICE — DRAPE HAND STERILE

## (undated) DEVICE — SUT MONOCRYL 4-0 UD P-3 18

## (undated) DEVICE — ADHESIVE DERMABOND ADVANCED

## (undated) DEVICE — DRESSING N ADH OIL EMUL 3X3

## (undated) DEVICE — TROCAR ENDOPATH XCEL 11MM 10CM

## (undated) DEVICE — NDL INSUF ULTRA VERESS 120MM

## (undated) DEVICE — TRAY MINOR ORTHO OMC

## (undated) DEVICE — TROCAR ENDOPATH XCEL 5MM 7.5CM

## (undated) DEVICE — DRAPE C-ARM ELAS CLIP 42X120IN

## (undated) DEVICE — DRAPE HALF SURGICAL 40X58IN

## (undated) DEVICE — SUT MONOCRYL 4-0 PS-2

## (undated) DEVICE — GLOVE BIOGEL ECLIPSE SZ 7

## (undated) DEVICE — SOL IRR SOD CHL .9% POUR

## (undated) DEVICE — SOL IRRI STRL WATER 1000ML

## (undated) DEVICE — DRAPE THREE-QTR REINF 53X77IN

## (undated) DEVICE — TOWEL OR DISP STRL BLUE 4/PK